# Patient Record
Sex: FEMALE | Race: WHITE | Employment: OTHER | URBAN - METROPOLITAN AREA
[De-identification: names, ages, dates, MRNs, and addresses within clinical notes are randomized per-mention and may not be internally consistent; named-entity substitution may affect disease eponyms.]

---

## 2017-01-25 ENCOUNTER — TELEPHONE (OUTPATIENT)
Dept: INTERNAL MEDICINE CLINIC | Age: 67
End: 2017-01-25

## 2017-01-26 RX ORDER — OMEPRAZOLE 20 MG/1
20 CAPSULE, DELAYED RELEASE ORAL DAILY
Qty: 90 CAPSULE | Refills: 3 | Status: SHIPPED | OUTPATIENT
Start: 2017-01-26 | End: 2017-12-11 | Stop reason: SDUPTHER

## 2017-01-26 RX ORDER — OMEPRAZOLE 20 MG/1
20 CAPSULE, DELAYED RELEASE ORAL DAILY
COMMUNITY
End: 2017-01-26 | Stop reason: SDUPTHER

## 2017-02-13 ENCOUNTER — TELEPHONE (OUTPATIENT)
Dept: INTERNAL MEDICINE CLINIC | Age: 67
End: 2017-02-13

## 2017-02-13 RX ORDER — BENZONATATE 200 MG/1
200 CAPSULE ORAL 3 TIMES DAILY PRN
COMMUNITY
End: 2017-02-13 | Stop reason: SDUPTHER

## 2017-02-13 RX ORDER — BENZONATATE 200 MG/1
200 CAPSULE ORAL 3 TIMES DAILY PRN
Qty: 21 CAPSULE | Refills: 0 | Status: SHIPPED | OUTPATIENT
Start: 2017-02-13 | End: 2018-12-11 | Stop reason: ALTCHOICE

## 2017-12-11 ENCOUNTER — OFFICE VISIT (OUTPATIENT)
Dept: INTERNAL MEDICINE CLINIC | Age: 67
End: 2017-12-11

## 2017-12-11 VITALS
HEART RATE: 60 BPM | BODY MASS INDEX: 45.64 KG/M2 | WEIGHT: 248 LBS | SYSTOLIC BLOOD PRESSURE: 136 MMHG | HEIGHT: 62 IN | DIASTOLIC BLOOD PRESSURE: 60 MMHG

## 2017-12-11 DIAGNOSIS — Z23 NEED FOR TDAP VACCINATION: ICD-10-CM

## 2017-12-11 DIAGNOSIS — K21.9 GASTROESOPHAGEAL REFLUX DISEASE, ESOPHAGITIS PRESENCE NOT SPECIFIED: Primary | ICD-10-CM

## 2017-12-11 DIAGNOSIS — F51.01 PRIMARY INSOMNIA: ICD-10-CM

## 2017-12-11 DIAGNOSIS — Z23 NEED FOR VACCINATION WITH 13-POLYVALENT PNEUMOCOCCAL CONJUGATE VACCINE: ICD-10-CM

## 2017-12-11 DIAGNOSIS — J45.20 MILD INTERMITTENT ASTHMA WITHOUT COMPLICATION: ICD-10-CM

## 2017-12-11 DIAGNOSIS — E66.01 MORBID OBESITY WITH BMI OF 40.0-44.9, ADULT (HCC): ICD-10-CM

## 2017-12-11 PROCEDURE — G0009 ADMIN PNEUMOCOCCAL VACCINE: HCPCS | Performed by: INTERNAL MEDICINE

## 2017-12-11 PROCEDURE — 99214 OFFICE O/P EST MOD 30 MIN: CPT | Performed by: INTERNAL MEDICINE

## 2017-12-11 PROCEDURE — 90670 PCV13 VACCINE IM: CPT | Performed by: INTERNAL MEDICINE

## 2017-12-11 PROCEDURE — 90715 TDAP VACCINE 7 YRS/> IM: CPT | Performed by: INTERNAL MEDICINE

## 2017-12-11 RX ORDER — AMITRIPTYLINE HYDROCHLORIDE 25 MG/1
25 TABLET, FILM COATED ORAL NIGHTLY
Qty: 90 TABLET | Refills: 3 | Status: SHIPPED | OUTPATIENT
Start: 2017-12-11 | End: 2018-12-11 | Stop reason: SDUPTHER

## 2017-12-11 RX ORDER — ZOLPIDEM TARTRATE 10 MG/1
10 TABLET ORAL NIGHTLY PRN
Qty: 90 TABLET | Refills: 1 | Status: SHIPPED | OUTPATIENT
Start: 2017-12-11 | End: 2018-12-11 | Stop reason: SDUPTHER

## 2017-12-11 RX ORDER — OMEPRAZOLE 20 MG/1
20 CAPSULE, DELAYED RELEASE ORAL DAILY
Qty: 90 CAPSULE | Refills: 3 | Status: SHIPPED | OUTPATIENT
Start: 2017-12-11 | End: 2017-12-11 | Stop reason: SDUPTHER

## 2017-12-11 RX ORDER — OMEPRAZOLE 20 MG/1
20 CAPSULE, DELAYED RELEASE ORAL DAILY
Qty: 90 CAPSULE | Refills: 3 | Status: SHIPPED | OUTPATIENT
Start: 2017-12-11 | End: 2018-10-29 | Stop reason: SDUPTHER

## 2017-12-11 RX ORDER — AMITRIPTYLINE HYDROCHLORIDE 25 MG/1
25 TABLET, FILM COATED ORAL NIGHTLY
Qty: 90 TABLET | Refills: 3 | Status: SHIPPED | OUTPATIENT
Start: 2017-12-11 | End: 2017-12-11 | Stop reason: SDUPTHER

## 2017-12-11 RX ORDER — NAPROXEN 500 MG/1
500 TABLET ORAL 2 TIMES DAILY WITH MEALS
Qty: 180 TABLET | Refills: 3 | Status: SHIPPED | OUTPATIENT
Start: 2017-12-11 | End: 2018-12-11 | Stop reason: SDUPTHER

## 2017-12-11 RX ORDER — NAPROXEN 500 MG/1
500 TABLET ORAL 2 TIMES DAILY WITH MEALS
Qty: 180 TABLET | Refills: 3 | Status: SHIPPED | OUTPATIENT
Start: 2017-12-11 | End: 2017-12-11 | Stop reason: SDUPTHER

## 2017-12-11 ASSESSMENT — PATIENT HEALTH QUESTIONNAIRE - PHQ9
SUM OF ALL RESPONSES TO PHQ QUESTIONS 1-9: 0
SUM OF ALL RESPONSES TO PHQ9 QUESTIONS 1 & 2: 0
2. FEELING DOWN, DEPRESSED OR HOPELESS: 0
1. LITTLE INTEREST OR PLEASURE IN DOING THINGS: 0

## 2017-12-12 ENCOUNTER — TELEPHONE (OUTPATIENT)
Dept: INTERNAL MEDICINE CLINIC | Age: 67
End: 2017-12-12

## 2017-12-12 NOTE — TELEPHONE ENCOUNTER
Pt states [facility] won't fill because [facility]     Now [facility] says [facility] has to cancel their orders   But after looking says 2 meds processed already and elavil too soon -- was filled in sept     Pt advised

## 2017-12-12 NOTE — TELEPHONE ENCOUNTER
Pt calling her scripts from yesterday were sent to [facility] and they need to go to [facility]. Pt states [facility] won't call [facility] and [facility] won't call [facility]. Please advise.

## 2017-12-29 ENCOUNTER — HOSPITAL ENCOUNTER (OUTPATIENT)
Dept: MAMMOGRAPHY | Age: 67
Discharge: HOME | End: 2017-12-29
Attending: INTERNAL MEDICINE | Admitting: INTERNAL MEDICINE

## 2017-12-29 ENCOUNTER — TELEPHONE (OUTPATIENT)
Dept: INTERNAL MEDICINE CLINIC | Age: 67
End: 2017-12-29

## 2017-12-29 DIAGNOSIS — Z12.31 ENCOUNTER FOR SCREENING MAMMOGRAM FOR BREAST CANCER: ICD-10-CM

## 2017-12-29 NOTE — TELEPHONE ENCOUNTER
Either dispense the medication as written for   the patient  Or if the patient so wishes she may schedule a followup to discuss a change in therapy

## 2018-01-22 RX ORDER — BUPROPION HYDROCHLORIDE 150 MG/1
TABLET ORAL
Qty: 90 TABLET | Refills: 3 | Status: SHIPPED | OUTPATIENT
Start: 2018-01-22 | End: 2018-12-11 | Stop reason: SDUPTHER

## 2018-03-16 ENCOUNTER — OFFICE VISIT (OUTPATIENT)
Dept: INTERNAL MEDICINE CLINIC | Age: 68
End: 2018-03-16

## 2018-03-16 VITALS
WEIGHT: 250 LBS | BODY MASS INDEX: 45.73 KG/M2 | DIASTOLIC BLOOD PRESSURE: 80 MMHG | TEMPERATURE: 97.4 F | HEART RATE: 76 BPM | SYSTOLIC BLOOD PRESSURE: 120 MMHG

## 2018-03-16 DIAGNOSIS — J45.21 MILD INTERMITTENT ASTHMA WITH ACUTE EXACERBATION: Primary | ICD-10-CM

## 2018-03-16 PROCEDURE — 99213 OFFICE O/P EST LOW 20 MIN: CPT | Performed by: INTERNAL MEDICINE

## 2018-03-16 RX ORDER — AZITHROMYCIN 250 MG/1
TABLET, FILM COATED ORAL
Qty: 1 PACKET | Refills: 0 | Status: SHIPPED | OUTPATIENT
Start: 2018-03-16 | End: 2018-03-26

## 2018-03-16 RX ORDER — ALBUTEROL SULFATE 90 UG/1
2 AEROSOL, METERED RESPIRATORY (INHALATION) EVERY 6 HOURS PRN
COMMUNITY
End: 2018-03-16 | Stop reason: SDUPTHER

## 2018-03-16 RX ORDER — NALTREXONE HYDROCHLORIDE AND BUPROPION HYDROCHLORIDE 8; 90 MG/1; MG/1
TABLET, EXTENDED RELEASE ORAL
COMMUNITY
Start: 2018-03-08 | End: 2018-12-11 | Stop reason: ALTCHOICE

## 2018-03-16 RX ORDER — ALBUTEROL SULFATE 90 UG/1
2 AEROSOL, METERED RESPIRATORY (INHALATION) EVERY 6 HOURS PRN
Qty: 1 INHALER | Refills: 5 | Status: SHIPPED
Start: 2018-03-16 | End: 2020-08-28 | Stop reason: SDUPTHER

## 2018-03-16 NOTE — PROGRESS NOTES
The patient is here for an acute appointment    Her  recently had a respiratory illness.   She feels like she is caught the illness from her     She complains of a cough productive of yellow sputum, wheezing and shortness of breath    Physical examination  Scattered wheezes  No palpable adenopathy  No exudate  Abdomen soft      Impression  Acute exacerbation of chronic obstructive pulmonary disease from lower respiratory tract infection    Plan  Albuterol 4 times a day when necessary and start on azithromycin

## 2018-09-11 ENCOUNTER — TELEPHONE (OUTPATIENT)
Dept: INTERNAL MEDICINE CLINIC | Age: 68
End: 2018-09-11

## 2018-09-20 ENCOUNTER — TELEPHONE (OUTPATIENT)
Dept: DERMATOLOGY | Age: 68
End: 2018-09-20

## 2018-10-29 RX ORDER — OMEPRAZOLE 20 MG/1
CAPSULE, DELAYED RELEASE ORAL
Qty: 90 CAPSULE | Refills: 3 | Status: SHIPPED | OUTPATIENT
Start: 2018-10-29 | End: 2019-10-26 | Stop reason: SDUPTHER

## 2018-12-11 ENCOUNTER — OFFICE VISIT (OUTPATIENT)
Dept: INTERNAL MEDICINE CLINIC | Age: 68
End: 2018-12-11
Payer: MEDICARE

## 2018-12-11 ENCOUNTER — TELEPHONE (OUTPATIENT)
Dept: INTERNAL MEDICINE CLINIC | Age: 68
End: 2018-12-11

## 2018-12-11 VITALS
HEART RATE: 88 BPM | DIASTOLIC BLOOD PRESSURE: 82 MMHG | WEIGHT: 232.8 LBS | SYSTOLIC BLOOD PRESSURE: 126 MMHG | BODY MASS INDEX: 42.84 KG/M2 | HEIGHT: 62 IN

## 2018-12-11 DIAGNOSIS — E66.01 MORBID OBESITY WITH BMI OF 40.0-44.9, ADULT (HCC): ICD-10-CM

## 2018-12-11 DIAGNOSIS — E78.49 OTHER HYPERLIPIDEMIA: ICD-10-CM

## 2018-12-11 DIAGNOSIS — Z00.00 ROUTINE GENERAL MEDICAL EXAMINATION AT A HEALTH CARE FACILITY: Primary | ICD-10-CM

## 2018-12-11 DIAGNOSIS — F51.01 PRIMARY INSOMNIA: ICD-10-CM

## 2018-12-11 PROCEDURE — 99397 PER PM REEVAL EST PAT 65+ YR: CPT | Performed by: INTERNAL MEDICINE

## 2018-12-11 RX ORDER — ZOLPIDEM TARTRATE 10 MG/1
10 TABLET ORAL NIGHTLY PRN
Qty: 90 TABLET | Refills: 1 | Status: SHIPPED | OUTPATIENT
Start: 2018-12-11 | End: 2019-06-14 | Stop reason: SDUPTHER

## 2018-12-11 RX ORDER — AMITRIPTYLINE HYDROCHLORIDE 25 MG/1
25 TABLET, FILM COATED ORAL NIGHTLY
Qty: 90 TABLET | Refills: 3 | Status: SHIPPED | OUTPATIENT
Start: 2018-12-11 | End: 2019-12-09 | Stop reason: SDUPTHER

## 2018-12-11 RX ORDER — BUPROPION HYDROCHLORIDE 150 MG/1
TABLET ORAL
Qty: 90 TABLET | Refills: 3 | Status: SHIPPED | OUTPATIENT
Start: 2018-12-11 | End: 2019-12-16 | Stop reason: SDUPTHER

## 2018-12-11 RX ORDER — NAPROXEN 500 MG/1
500 TABLET ORAL 2 TIMES DAILY WITH MEALS
Qty: 180 TABLET | Refills: 3 | Status: SHIPPED | OUTPATIENT
Start: 2018-12-11 | End: 2019-12-16 | Stop reason: SDUPTHER

## 2018-12-11 ASSESSMENT — PATIENT HEALTH QUESTIONNAIRE - PHQ9
SUM OF ALL RESPONSES TO PHQ QUESTIONS 1-9: 0
SUM OF ALL RESPONSES TO PHQ9 QUESTIONS 1 & 2: 0
2. FEELING DOWN, DEPRESSED OR HOPELESS: 0
SUM OF ALL RESPONSES TO PHQ QUESTIONS 1-9: 0
1. LITTLE INTEREST OR PLEASURE IN DOING THINGS: 0

## 2018-12-13 NOTE — PROGRESS NOTES
Chief Complaint   Patient presents with    Medication Refill       Reanna Bright 76 y.o. female is here for Physical examination in renewal of her medications for her chronic problems. .  Denies chest pain chest tightness shortness of breath or other acute cardiovascular symptoms     Her asthma has been reasonably well-controlled    She is status post gastric sleeve for morbid obesity. She is being followed at the [place]. She is attempting to the best of her ability to maintain what has been substantial weight loss    She continues on a multidrug regimen as listed    She denies any other associated symptoms or modifying factors    Current Outpatient Prescriptions on File Prior to Visit   Medication Sig Dispense Refill    omeprazole (PRILOSEC) 20 MG delayed release capsule TAKE 1 CAPSULE DAILY 90 capsule 3    ESTRACE VAGINAL 0.1 MG/GM vaginal cream       ibuprofen (ADVIL;MOTRIN) 800 MG tablet Take 1 tablet by mouth 3 times daily as needed for Pain 270 tablet 1    vitamin B-12 (CYANOCOBALAMIN) 100 MCG tablet Take 50 mcg by mouth daily.  Vitamin D (CHOLECALCIFEROL) 1000 UNITS CAPS capsule Take 1,250 Units by mouth daily.  Biotin 5000 MCG CAPS Take  by mouth.  calcium carbonate 600 MG TABS tablet Take 1 tablet by mouth daily.  estradiol (ESTRACE) 1 MG tablet Take 1 mg by mouth daily.  progesterone (PROMETRIUM) 200 MG capsule Take 200 mg by mouth. Days 16-25       albuterol sulfate HFA (PROAIR HFA) 108 (90 Base) MCG/ACT inhaler Inhale 2 puffs into the lungs every 6 hours as needed for Wheezing 1 Inhaler 5    Omega-3 Fatty Acids (FISH OIL) 1000 MG CAPS Take 3,000 mg by mouth 3 times daily.  EPIPEN 2-KAPIL 0.3 MG/0.3ML MEEK injection       albuterol (PROVENTIL) (2.5 MG/3ML) 0.083% nebulizer solution Take 2.5 mg by nebulization every 6 hours as needed for Wheezing. No current facility-administered medications on file prior to visit.         Past Medical History:

## 2019-01-11 ENCOUNTER — TELEPHONE (OUTPATIENT)
Dept: INTERNAL MEDICINE CLINIC | Age: 69
End: 2019-01-11

## 2019-01-11 DIAGNOSIS — E78.5 HYPERLIPIDEMIA, UNSPECIFIED HYPERLIPIDEMIA TYPE: Primary | ICD-10-CM

## 2019-01-11 DIAGNOSIS — E78.5 HYPERLIPIDEMIA, UNSPECIFIED HYPERLIPIDEMIA TYPE: ICD-10-CM

## 2019-01-11 LAB
CHOLESTEROL, TOTAL: 230 MG/DL (ref 0–199)
HDLC SERPL-MCNC: 72 MG/DL (ref 40–60)
LDL CHOLESTEROL CALCULATED: 127 MG/DL
TRIGL SERPL-MCNC: 154 MG/DL (ref 0–150)
VLDLC SERPL CALC-MCNC: 31 MG/DL

## 2019-01-18 ENCOUNTER — HOSPITAL ENCOUNTER (OUTPATIENT)
Dept: MAMMOGRAPHY | Age: 69
Setting detail: THERAPIES SERIES
Discharge: HOME | End: 2019-01-18
Payer: MEDICARE

## 2019-01-18 DIAGNOSIS — Z12.31 BREAST CANCER SCREENING BY MAMMOGRAM: ICD-10-CM

## 2019-01-18 PROCEDURE — 77067 SCR MAMMO BI INCL CAD: CPT

## 2019-01-29 ENCOUNTER — OFFICE VISIT (OUTPATIENT)
Dept: DERMATOLOGY | Age: 69
End: 2019-01-29
Payer: MEDICARE

## 2019-01-29 DIAGNOSIS — D18.00 ANGIOMA: ICD-10-CM

## 2019-01-29 DIAGNOSIS — L73.8 SEBACEOUS GLAND HYPERPLASIA: ICD-10-CM

## 2019-01-29 DIAGNOSIS — L85.1 STUCCO KERATOSES: ICD-10-CM

## 2019-01-29 DIAGNOSIS — L82.1 SEBORRHEIC KERATOSIS: ICD-10-CM

## 2019-01-29 DIAGNOSIS — D22.9 MULTIPLE NEVI: Primary | ICD-10-CM

## 2019-01-29 DIAGNOSIS — L81.4 LENTIGINES: ICD-10-CM

## 2019-01-29 PROCEDURE — 99203 OFFICE O/P NEW LOW 30 MIN: CPT | Performed by: DERMATOLOGY

## 2019-06-13 DIAGNOSIS — F51.01 PRIMARY INSOMNIA: ICD-10-CM

## 2019-06-13 RX ORDER — ZOLPIDEM TARTRATE 10 MG/1
10 TABLET ORAL NIGHTLY PRN
Qty: 90 TABLET | Refills: 1 | Status: CANCELLED | OUTPATIENT
Start: 2019-06-13 | End: 2019-12-10

## 2019-06-14 RX ORDER — ZOLPIDEM TARTRATE 10 MG/1
10 TABLET ORAL NIGHTLY PRN
Qty: 90 TABLET | Refills: 1 | Status: SHIPPED | OUTPATIENT
Start: 2019-06-14 | End: 2019-12-16 | Stop reason: SDUPTHER

## 2019-07-29 ENCOUNTER — OFFICE VISIT (OUTPATIENT)
Dept: DERMATOLOGY | Age: 69
End: 2019-07-29
Payer: MEDICARE

## 2019-07-29 DIAGNOSIS — L82.1 SEBORRHEIC KERATOSIS: ICD-10-CM

## 2019-07-29 DIAGNOSIS — L81.4 LENTIGINES: ICD-10-CM

## 2019-07-29 DIAGNOSIS — B00.9 HSV INFECTION: ICD-10-CM

## 2019-07-29 DIAGNOSIS — L30.9 DERMATITIS: Primary | ICD-10-CM

## 2019-07-29 DIAGNOSIS — Z85.828 HISTORY OF NONMELANOMA SKIN CANCER: ICD-10-CM

## 2019-07-29 DIAGNOSIS — D22.9 MULTIPLE NEVI: ICD-10-CM

## 2019-07-29 DIAGNOSIS — L85.1 STUCCO KERATOSES: ICD-10-CM

## 2019-07-29 PROCEDURE — 99214 OFFICE O/P EST MOD 30 MIN: CPT | Performed by: DERMATOLOGY

## 2019-07-29 RX ORDER — CLOBETASOL PROPIONATE 0.5 MG/G
OINTMENT TOPICAL
Qty: 30 G | Refills: 0 | Status: SHIPPED | OUTPATIENT
Start: 2019-07-29 | End: 2020-12-15 | Stop reason: ALTCHOICE

## 2019-07-29 RX ORDER — VALACYCLOVIR HYDROCHLORIDE 1 G/1
TABLET, FILM COATED ORAL
Qty: 16 TABLET | Refills: 2 | Status: SHIPPED | OUTPATIENT
Start: 2019-07-29

## 2019-07-29 NOTE — PATIENT INSTRUCTIONS
Protecting Yourself From the Sun    · Apply broad spectrum water resistant sunscreen with an SPF of at least 30 to exposed areas of the skin. Dont forget the ears and lips! Remember to reapply sunscreen about every 2 hours and after swimming or sweating. · Wear sun protective clothing. Swim shirts (aka. rash guards) are a great idea and negates the need to reapply sunscreen in those areas.      · Seek the shade whenever possible especially between the hours of 10 am and 4 pm when the suns rays are the strongest.     · Avoid tanning beds        botox - 12/unit  - 16-20 units per site  - lasts 3-4 mos    Filler - 500-600/syringe  - lasts 9-12 mos

## 2019-07-29 NOTE — PROGRESS NOTES
scaly pink patch  2. trunk and extremities with scattered brown and red macules and papules   Chest, arm and L temple with dull brown macules  3. Depressed round scar on the R nasal tip - clear  4. Dorsum of the hands with tan macules; cheeks with tan macules  5. Legs with grayish-tan stuck-on dull 2-3 mm papules  6. Lips clear    Assessment and Plan     1. Dermatitis - R middle finger  - clobetasol oint bid; ed se/misuse    2. Benign-appearing nevi, SK's (chest, arm, L temple) and angiomas  3. Hx of NMSC, no signs recurrence  - educ re ABCD's of MM   educ sun protection   encouraged skin check yearly (sooner if indicated), self checks    4. Lentigines on the hands and cheeks  - ed laser, 150/trx; ed mult trx, dyspig, erythema, downtime, new lesions  - ed peels for the face - VI - 250    5. Stucco keratosis  - ed/reassured    6. HSV flares - lips - clear today - valtrex prn flares    Ed botox for the glabella - brow lift and possibly lower FH line  Ed filler for the NL and MM area.     >30 mins spent in discussion with pt regarding above

## 2019-10-28 RX ORDER — OMEPRAZOLE 20 MG/1
CAPSULE, DELAYED RELEASE ORAL
Qty: 90 CAPSULE | Refills: 4 | Status: SHIPPED | OUTPATIENT
Start: 2019-10-28 | End: 2019-12-16 | Stop reason: SDUPTHER

## 2019-11-24 RX ORDER — PROMETHAZINE HYDROCHLORIDE 25 MG/1
25 TABLET ORAL 4 TIMES DAILY PRN
Qty: 20 TABLET | Refills: 0 | Status: SHIPPED | OUTPATIENT
Start: 2019-11-24 | End: 2019-12-01

## 2019-11-25 ENCOUNTER — TELEPHONE (OUTPATIENT)
Dept: FAMILY MEDICINE CLINIC | Age: 69
End: 2019-11-25

## 2019-12-09 RX ORDER — AMITRIPTYLINE HYDROCHLORIDE 25 MG/1
TABLET, FILM COATED ORAL
Qty: 90 TABLET | Refills: 4 | Status: SHIPPED | OUTPATIENT
Start: 2019-12-09 | End: 2020-12-15 | Stop reason: SDUPTHER

## 2019-12-16 ENCOUNTER — OFFICE VISIT (OUTPATIENT)
Dept: INTERNAL MEDICINE CLINIC | Age: 69
End: 2019-12-16
Payer: MEDICARE

## 2019-12-16 VITALS
BODY MASS INDEX: 42.88 KG/M2 | DIASTOLIC BLOOD PRESSURE: 80 MMHG | WEIGHT: 233 LBS | SYSTOLIC BLOOD PRESSURE: 138 MMHG | HEIGHT: 62 IN | HEART RATE: 76 BPM

## 2019-12-16 DIAGNOSIS — F51.01 PRIMARY INSOMNIA: ICD-10-CM

## 2019-12-16 DIAGNOSIS — R73.9 HYPERGLYCEMIA: ICD-10-CM

## 2019-12-16 DIAGNOSIS — E55.9 VITAMIN D DEFICIENCY: ICD-10-CM

## 2019-12-16 DIAGNOSIS — D64.9 ANEMIA, UNSPECIFIED TYPE: ICD-10-CM

## 2019-12-16 DIAGNOSIS — E78.5 HYPERLIPIDEMIA, UNSPECIFIED HYPERLIPIDEMIA TYPE: Primary | ICD-10-CM

## 2019-12-16 PROCEDURE — 99214 OFFICE O/P EST MOD 30 MIN: CPT | Performed by: INTERNAL MEDICINE

## 2019-12-16 RX ORDER — BUPROPION HYDROCHLORIDE 150 MG/1
TABLET ORAL
Qty: 90 TABLET | Refills: 3 | Status: SHIPPED | OUTPATIENT
Start: 2019-12-16 | End: 2020-12-15 | Stop reason: SDUPTHER

## 2019-12-16 RX ORDER — NAPROXEN 500 MG/1
500 TABLET ORAL 2 TIMES DAILY WITH MEALS
Qty: 180 TABLET | Refills: 3 | Status: SHIPPED | OUTPATIENT
Start: 2019-12-16 | End: 2021-01-14 | Stop reason: SDUPTHER

## 2019-12-16 RX ORDER — OMEPRAZOLE 20 MG/1
20 CAPSULE, DELAYED RELEASE ORAL DAILY
Qty: 90 CAPSULE | Refills: 3 | Status: SHIPPED | OUTPATIENT
Start: 2019-12-16 | End: 2020-08-28 | Stop reason: SDUPTHER

## 2019-12-16 RX ORDER — LORCASERIN HYDROCHLORIDE HEMIHYDRATE 10 MG/1
TABLET ORAL
COMMUNITY
Start: 2019-10-15 | End: 2020-02-27 | Stop reason: ALTCHOICE

## 2019-12-16 RX ORDER — ZOLPIDEM TARTRATE 10 MG/1
10 TABLET ORAL NIGHTLY PRN
Qty: 90 TABLET | Refills: 1 | Status: SHIPPED | OUTPATIENT
Start: 2019-12-16 | End: 2020-12-15 | Stop reason: SDUPTHER

## 2019-12-16 ASSESSMENT — PATIENT HEALTH QUESTIONNAIRE - PHQ9
2. FEELING DOWN, DEPRESSED OR HOPELESS: 0
SUM OF ALL RESPONSES TO PHQ QUESTIONS 1-9: 0
SUM OF ALL RESPONSES TO PHQ QUESTIONS 1-9: 0
SUM OF ALL RESPONSES TO PHQ9 QUESTIONS 1 & 2: 0
1. LITTLE INTEREST OR PLEASURE IN DOING THINGS: 0

## 2019-12-17 LAB
A/G RATIO: 1.7 (ref 1.1–2.2)
ALBUMIN SERPL-MCNC: 4 G/DL (ref 3.4–5)
ALP BLD-CCNC: 91 U/L (ref 40–129)
ALT SERPL-CCNC: 12 U/L (ref 10–40)
ANION GAP SERPL CALCULATED.3IONS-SCNC: 13 MMOL/L (ref 3–16)
AST SERPL-CCNC: 17 U/L (ref 15–37)
BILIRUB SERPL-MCNC: 0.4 MG/DL (ref 0–1)
BUN BLDV-MCNC: 16 MG/DL (ref 7–20)
CALCIUM SERPL-MCNC: 9.3 MG/DL (ref 8.3–10.6)
CHLORIDE BLD-SCNC: 100 MMOL/L (ref 99–110)
CHOLESTEROL, TOTAL: 186 MG/DL (ref 0–199)
CO2: 25 MMOL/L (ref 21–32)
CREAT SERPL-MCNC: <0.5 MG/DL (ref 0.6–1.2)
GFR AFRICAN AMERICAN: >60
GFR NON-AFRICAN AMERICAN: >60
GLOBULIN: 2.3 G/DL
GLUCOSE BLD-MCNC: 82 MG/DL (ref 70–99)
HDLC SERPL-MCNC: 70 MG/DL (ref 40–60)
LDL CHOLESTEROL CALCULATED: 98 MG/DL
POTASSIUM SERPL-SCNC: 4.6 MMOL/L (ref 3.5–5.1)
SODIUM BLD-SCNC: 138 MMOL/L (ref 136–145)
TOTAL PROTEIN: 6.3 G/DL (ref 6.4–8.2)
TRIGL SERPL-MCNC: 88 MG/DL (ref 0–150)
VITAMIN B-12: >2000 PG/ML (ref 211–911)
VITAMIN D 25-HYDROXY: 51 NG/ML
VLDLC SERPL CALC-MCNC: 18 MG/DL

## 2019-12-18 LAB
ESTIMATED AVERAGE GLUCOSE: 93.9 MG/DL
HBA1C MFR BLD: 4.9 %

## 2020-01-27 ENCOUNTER — TELEPHONE (OUTPATIENT)
Dept: INTERNAL MEDICINE CLINIC | Age: 70
End: 2020-01-27

## 2020-02-05 ENCOUNTER — HOSPITAL ENCOUNTER (OUTPATIENT)
Dept: MAMMOGRAPHY | Age: 70
Discharge: HOME | End: 2020-02-05
Payer: MEDICARE

## 2020-02-05 PROCEDURE — 77067 SCR MAMMO BI INCL CAD: CPT

## 2020-02-12 ENCOUNTER — HOSPITAL ENCOUNTER (OUTPATIENT)
Dept: WOMENS IMAGING | Age: 70
Discharge: HOME | End: 2020-02-12
Payer: MEDICARE

## 2020-02-12 PROCEDURE — G0279 TOMOSYNTHESIS, MAMMO: HCPCS

## 2020-02-17 ENCOUNTER — PRE-PROCEDURE TELEPHONE (OUTPATIENT)
Dept: WOMENS IMAGING | Age: 70
End: 2020-02-17

## 2020-02-17 ENCOUNTER — HOSPITAL ENCOUNTER (OUTPATIENT)
Dept: ULTRASOUND IMAGING | Age: 70
Discharge: HOME | End: 2020-02-17
Payer: MEDICARE

## 2020-02-17 PROCEDURE — 76642 ULTRASOUND BREAST LIMITED: CPT

## 2020-02-21 ENCOUNTER — HOSPITAL ENCOUNTER (OUTPATIENT)
Dept: WOMENS IMAGING | Age: 70
Discharge: HOME | End: 2020-02-21
Payer: MEDICARE

## 2020-02-21 ENCOUNTER — HOSPITAL ENCOUNTER (OUTPATIENT)
Dept: ULTRASOUND IMAGING | Age: 70
Discharge: HOME | End: 2020-02-21
Payer: MEDICARE

## 2020-02-21 PROCEDURE — 88360 TUMOR IMMUNOHISTOCHEM/MANUAL: CPT

## 2020-02-21 PROCEDURE — A4648 IMPLANTABLE TISSUE MARKER: HCPCS

## 2020-02-21 PROCEDURE — 2500000003 HC RX 250 WO HCPCS: Performed by: OBSTETRICS & GYNECOLOGY

## 2020-02-21 PROCEDURE — 77065 DX MAMMO INCL CAD UNI: CPT

## 2020-02-21 PROCEDURE — 88342 IMHCHEM/IMCYTCHM 1ST ANTB: CPT

## 2020-02-21 PROCEDURE — 88305 TISSUE EXAM BY PATHOLOGIST: CPT

## 2020-02-21 RX ORDER — LIDOCAINE HYDROCHLORIDE 10 MG/ML
5 INJECTION, SOLUTION EPIDURAL; INFILTRATION; INTRACAUDAL; PERINEURAL ONCE
Status: COMPLETED | OUTPATIENT
Start: 2020-02-21 | End: 2020-02-21

## 2020-02-21 RX ORDER — LIDOCAINE HYDROCHLORIDE AND EPINEPHRINE 10; 10 MG/ML; UG/ML
20 INJECTION, SOLUTION INFILTRATION; PERINEURAL ONCE
Status: COMPLETED | OUTPATIENT
Start: 2020-02-21 | End: 2020-02-21

## 2020-02-21 RX ADMIN — LIDOCAINE HYDROCHLORIDE,EPINEPHRINE BITARTRATE 20 ML: 10; .01 INJECTION, SOLUTION INFILTRATION; PERINEURAL at 15:08

## 2020-02-21 RX ADMIN — LIDOCAINE HYDROCHLORIDE 5 ML: 10 INJECTION, SOLUTION EPIDURAL; INFILTRATION; INTRACAUDAL; PERINEURAL at 15:05

## 2020-02-21 ASSESSMENT — PAIN SCALES - GENERAL: PAINLEVEL_OUTOF10: 3

## 2020-02-24 ENCOUNTER — FOLLOWUP TELEPHONE ENCOUNTER (OUTPATIENT)
Dept: WOMENS IMAGING | Age: 70
End: 2020-02-24

## 2020-02-24 NOTE — PROGRESS NOTES
Nurse Navigator reviewed breast biopsy results showing IDC on the pathology report. NN explained the terminology and reviewed the results for ER/RI and the additional HER2 test. We discussed several questions and process for establishing a care team and possible additional testing. [facility] and patient prefers to see Dr Eun Berry at [facility]. NN offered additional website reading if interested. Given ACS, NCCN, and [email or web address].  Pt/family given NN phone number for further assistance. Elena Man is  (52 years in May), one daughter [facility] who recently moved back here with 21 month old from [place]. Very supportive family. Worked as an  and still does some work, own business. yes

## 2020-02-26 ASSESSMENT — ENCOUNTER SYMPTOMS
RESPIRATORY NEGATIVE: 1
EYES NEGATIVE: 1
GASTROINTESTINAL NEGATIVE: 1

## 2020-02-26 NOTE — PROGRESS NOTES
ASCO/CAP guidelines: 0, 1+, 2+, 3+. Her2/renay  \"positive\" (3+) requires circrumferential membrane staining that is  complete, intense and in >10% of tumor cells. Equivocal (2+) cases are  defined as weak to moderate complete membrane staining in >10% of cells. Negative cases (0 or 1+, respectively) display no staining or incomplete  membrane staining that is faint/barely perceptible and in >10% of tumor  cells.   Gail Wu et al, Human Epidermal Growth Factor Receptor 2  Testing in Breast Cancer, Arch Pathol Lab Med, Vol 142, November, 2018). Select cases, including all 2+/equivocal for Her2/renay on routine IHC  staining, will be sent for Her2/renay analysis by FISH. The stain was performed on [employer], and performed with  the [employer] ultraView Universal DAB detection kit. These assays have not  been validated on decalcified tissue. Results should be interpreted with  caution given the likelihood of false negativity on decalcified  specimens. Analyte specific reagent (ASR) disclaimer: The use of one or  more reagents in the above tests is regulated as an analyte specific  reagent. These tests were developed and their performance characteristics  determined by the [facility]. They have  not been cleared by the [facility] and Drug Administration.  The FDA has  determined that such clearance or approval is not necessary.    Adam Knutson            Past Medical History:   Diagnosis Date    Basal cell carcinoma     Depression     Obesity     Post-menopausal    :        Past Surgical History:   Procedure Laterality Date    KNEE ARTHROSCOPY  5/2004    left knee    KNEE SURGERY  5/2005    lt TKR    MOHS SURGERY     :        Allergies as of 02/27/2020 - Review Complete 02/17/2020   Allergen Reaction Noted    Cephalosporins  12/14/2011    Cymbalta [duloxetine hcl]  02/11/2014    Keflex [cephalexin] Nausea Only 04/08/2014    Lyrica [pregabalin]  02/11/2014    Vioxx  2011    Codeine Nausea And Vomiting 2014   :        Social History     Tobacco Use    Smoking status: Never Smoker    Smokeless tobacco: Never Used   Substance Use Topics    Alcohol use: Not on file    Drug use: Not on file   :      Family History: Mother: Breast cancer with metastatic disease, diagnosed age 68  Sister: Esophageal cancer, diagnosed age 62  No additional family history of breast or ovarian cancer    Hormonal History:   a.0  m.0  Menarche at age 15. First live birth at age 27. did breastfeed. Postmenopausal at age 46  Hysterectomy: no hysterectomy  Current HRT user  OCP not taking    Medications:  Medication documentation has been reviewedin the electronic medical record and patient office intake form. Exam:  There were no vitals taken for this visit. Constitutional: She appears well-nourished. No apparent distress. Breast: The patient was examined in the upright and supine position. She has a \"DDD\" cup breast. Breasts are symmetrically ptotic. Right: There were no new masses or changes in breast contour. There were no skin changes of the breast or nipple areolar complex. There was no nipple inversion or discharge. Left: There were no new masses or changes in breast contour. There were no skin changes of the breast or nipple areolar complex. There was no nipple inversion or discharge. There is no axillary lymphadenopathy palpated bilaterally. Head: Normocephalic and atraumatic. Eyes: EOM are normal. Pupilsare equal, round, and reactive to light. Neck: Neck supple. No tracheal deviation present. Cardiovascular: regular rate. Extremities appear well perfused. Pulmonary: respirations are non-labored and without audible distress  Lymphatics: no palpable axillary or cervical lymphadenopathy. Skin: No rash noted. No erythema. Neurologic: alert and oriented.           Assessment/Plan:  [id number]:  IA left breast cancer  ER + NM+ HER2 negative. I have reviewed the results of her most recent breast imaging and pathology with her. The surgical rational and technical details of undergoing breast conservation therapy versus a mastectomy were reviewed. She understood that patients who undergo breast conservation therapy, systemic therapy, and radiotherapy have comparable local recurrences and overall survival to those patients undergoing a mastectomy. She understands that patients may experience an asymmetric change in breast contour with breast conservation that can be exacerbated with radiation therapy. We discussed the technique of needle localization. I explained that on the day of surgery she will be taken to radiology where a needle and small wire will be advanced to the location of the lesion. This will be done by targeting the lesion, near which the clip was placed post biopsy. This is performed using either mammographic or ultrasound guidance. We discussed the aspects of breast conservation including the need for negative margins. We discussed the risk of up to a 15-20% chance of having a positive margin and that this would in fact lead to additional surgery. We discussed marking the surgical cavity for potential future radiation. We also discussed the option of a mastectomy. We discussed that although we remove the breast in this procedure, there is still a risk of recurrent disease and reviewed expectations on residual breast tissue. We discussed margins. We reviewed hospitalization and the need for surgical drains. We discussed additional risk and benefits of surgery which include but are not limited to anesthesia related risks, bleeding, range of motion difficulty, infection (<4%), wound complications and unappealing cosmetics. We discussed the risk of contralateral breast cancer. We also discussed the possibility of future recurrences. We reviewed expectations for recovery.     We discussed a sentinel lymph node biopsy in

## 2020-02-27 ENCOUNTER — INITIAL CONSULT (OUTPATIENT)
Dept: SURGERY | Age: 70
End: 2020-02-27
Payer: MEDICARE

## 2020-02-27 VITALS
SYSTOLIC BLOOD PRESSURE: 119 MMHG | BODY MASS INDEX: 43.61 KG/M2 | HEIGHT: 62 IN | HEART RATE: 91 BPM | WEIGHT: 237 LBS | DIASTOLIC BLOOD PRESSURE: 83 MMHG | OXYGEN SATURATION: 96 %

## 2020-02-27 PROCEDURE — 99205 OFFICE O/P NEW HI 60 MIN: CPT | Performed by: SURGERY

## 2020-02-27 RX ORDER — SODIUM CHLORIDE 0.9 % (FLUSH) 0.9 %
10 SYRINGE (ML) INJECTION EVERY 12 HOURS SCHEDULED
Status: CANCELLED | OUTPATIENT
Start: 2020-02-27

## 2020-02-27 RX ORDER — SODIUM CHLORIDE 0.9 % (FLUSH) 0.9 %
10 SYRINGE (ML) INJECTION PRN
Status: CANCELLED | OUTPATIENT
Start: 2020-02-27

## 2020-02-27 RX ORDER — LIDOCAINE HYDROCHLORIDE 10 MG/ML
0.1 INJECTION, SOLUTION EPIDURAL; INFILTRATION; INTRACAUDAL; PERINEURAL
Status: SHIPPED | OUTPATIENT
Start: 2020-02-27 | End: 2020-02-27

## 2020-02-27 RX ORDER — SODIUM CHLORIDE, SODIUM LACTATE, POTASSIUM CHLORIDE, CALCIUM CHLORIDE 600; 310; 30; 20 MG/100ML; MG/100ML; MG/100ML; MG/100ML
INJECTION, SOLUTION INTRAVENOUS CONTINUOUS
Status: CANCELLED | OUTPATIENT
Start: 2020-02-27

## 2020-02-28 ENCOUNTER — TELEPHONE (OUTPATIENT)
Dept: SURGERY | Age: 70
End: 2020-02-28

## 2020-02-28 NOTE — TELEPHONE ENCOUNTER
Received a call from Tremaine santos,  for [facility]. She stated, \" I just spoke with Mrs. Dale Guillermo to let her know we had received a referral for her to see Dr. Guy Ortez, the patient stated, \" Oh no!, I'm not seeing him, I'm not going to someone I don't know. \"   [place] asked her if Dr. Eleuterio Tyler knew this and Tremaine santos stated patient then said,\" They just put you with anyone, you know he's [facility] \"  \"I'm going to see who my friends go to. \"  Tremaine santos stated, \" I tried to ask her some questions and she hung up the phone. \"    Dr. Eleuterio Tyler has been made aware.

## 2020-03-09 ENCOUNTER — TELEPHONE (OUTPATIENT)
Dept: SURGERY | Age: 70
End: 2020-03-09

## 2020-03-10 NOTE — TELEPHONE ENCOUNTER
I have contacted surgery scheduling, mammogram NN Mary Jamison RN, [facility] department , and Nuclear medicine to let them know this patient has cancelled her surgery.

## 2020-03-19 ENCOUNTER — TELEPHONE (OUTPATIENT)
Dept: INTERNAL MEDICINE CLINIC | Age: 70
End: 2020-03-19

## 2020-03-24 ENCOUNTER — OFFICE VISIT (OUTPATIENT)
Dept: INTERNAL MEDICINE CLINIC | Age: 70
End: 2020-03-24
Payer: MEDICARE

## 2020-03-24 VITALS
DIASTOLIC BLOOD PRESSURE: 66 MMHG | TEMPERATURE: 98.2 F | HEIGHT: 62 IN | WEIGHT: 237 LBS | SYSTOLIC BLOOD PRESSURE: 120 MMHG | BODY MASS INDEX: 43.61 KG/M2 | HEART RATE: 80 BPM

## 2020-03-24 LAB
A/G RATIO: 1.9 (ref 1.1–2.2)
ALBUMIN SERPL-MCNC: 4.4 G/DL (ref 3.4–5)
ALP BLD-CCNC: 110 U/L (ref 40–129)
ALT SERPL-CCNC: 18 U/L (ref 10–40)
ANION GAP SERPL CALCULATED.3IONS-SCNC: 13 MMOL/L (ref 3–16)
AST SERPL-CCNC: 22 U/L (ref 15–37)
BASOPHILS ABSOLUTE: 0.1 K/UL (ref 0–0.2)
BASOPHILS RELATIVE PERCENT: 0.7 %
BILIRUB SERPL-MCNC: 0.4 MG/DL (ref 0–1)
BUN BLDV-MCNC: 17 MG/DL (ref 7–20)
CALCIUM SERPL-MCNC: 10.2 MG/DL (ref 8.3–10.6)
CHLORIDE BLD-SCNC: 101 MMOL/L (ref 99–110)
CO2: 29 MMOL/L (ref 21–32)
CREAT SERPL-MCNC: 0.5 MG/DL (ref 0.6–1.2)
EOSINOPHILS ABSOLUTE: 0.2 K/UL (ref 0–0.6)
EOSINOPHILS RELATIVE PERCENT: 3 %
GFR AFRICAN AMERICAN: >60
GFR NON-AFRICAN AMERICAN: >60
GLOBULIN: 2.3 G/DL
GLUCOSE BLD-MCNC: 84 MG/DL (ref 70–99)
HCT VFR BLD CALC: 43.4 % (ref 36–48)
HEMOGLOBIN: 14.1 G/DL (ref 12–16)
LYMPHOCYTES ABSOLUTE: 2.8 K/UL (ref 1–5.1)
LYMPHOCYTES RELATIVE PERCENT: 35.2 %
MCH RBC QN AUTO: 32.3 PG (ref 26–34)
MCHC RBC AUTO-ENTMCNC: 32.6 G/DL (ref 31–36)
MCV RBC AUTO: 99.2 FL (ref 80–100)
MONOCYTES ABSOLUTE: 0.6 K/UL (ref 0–1.3)
MONOCYTES RELATIVE PERCENT: 7.6 %
NEUTROPHILS ABSOLUTE: 4.2 K/UL (ref 1.7–7.7)
NEUTROPHILS RELATIVE PERCENT: 53.5 %
PDW BLD-RTO: 13.7 % (ref 12.4–15.4)
PLATELET # BLD: 270 K/UL (ref 135–450)
PMV BLD AUTO: 8.2 FL (ref 5–10.5)
POTASSIUM SERPL-SCNC: 4.8 MMOL/L (ref 3.5–5.1)
RBC # BLD: 4.37 M/UL (ref 4–5.2)
SODIUM BLD-SCNC: 143 MMOL/L (ref 136–145)
TOTAL PROTEIN: 6.7 G/DL (ref 6.4–8.2)
WBC # BLD: 7.9 K/UL (ref 4–11)

## 2020-03-24 PROCEDURE — 99214 OFFICE O/P EST MOD 30 MIN: CPT | Performed by: INTERNAL MEDICINE

## 2020-03-24 ASSESSMENT — PATIENT HEALTH QUESTIONNAIRE - PHQ9
2. FEELING DOWN, DEPRESSED OR HOPELESS: 0
SUM OF ALL RESPONSES TO PHQ9 QUESTIONS 1 & 2: 0
SUM OF ALL RESPONSES TO PHQ QUESTIONS 1-9: 0
SUM OF ALL RESPONSES TO PHQ QUESTIONS 1-9: 0
1. LITTLE INTEREST OR PLEASURE IN DOING THINGS: 0

## 2020-03-24 NOTE — PROGRESS NOTES
Abdomen:   Soft, non-tender, bowel sounds active all four quadrants,  no masses, no organomegaly   Skin: Skin color, texture, turgor normal, no rashes or lesions   Lymph nodes: Cervical, supraclavicular  Adenopathy is absent   Neurologic: No focal neurological deficits noted       No components found for: CHLPL  Lab Results   Component Value Date    TRIG 88 12/17/2019    TRIG 154 (H) 01/11/2019    TRIG 80 12/07/2016     Lab Results   Component Value Date    HDL 70 (H) 12/17/2019    HDL 72 (H) 01/11/2019    HDL 94 (H) 12/07/2016     Lab Results   Component Value Date    LDLCALC 98 12/17/2019    LDLCALC 127 (H) 01/11/2019    LDLCALC 117 (H) 12/07/2016     Lab Results   Component Value Date    LABVLDL 18 12/17/2019    LABVLDL 31 01/11/2019    LABVLDL 16 12/07/2016     Lab Results   Component Value Date    CREATININE <0.5 (L) 12/17/2019         ASSESSMENT/PLAN[de-identified]          I discussed the diagnosis of breast cancer with her and highly recommended that she give a good therapeutic trial of the Arimidex    Rationale for reduction in recurrence was discussed with her. Continue vitamin D supplementation    She has had an acute exacerbation of her chronic depression from the recent diagnosis, her Wellbutrin was increased per her surgeon and I advised her I concurred with this change. Advised against the use of Q-tips, she currently has a laceration in her left external auditory canal which is causing her symptoms    Continue higher dose of antidepressant medication    . Diagnosis Orders   1. Malignant neoplasm of left breast in female, estrogen receptor positive, unspecified site of breast ([facility] [address]  Comprehensive Metabolic Panel    CBC Auto Differential   2. Primary insomnia     3. Hyperlipidemia, unspecified hyperlipidemia type     4. Vitamin D deficiency     5.  Laceration of left external auditory canal, initial encounter

## 2020-05-21 ENCOUNTER — TELEPHONE (OUTPATIENT)
Dept: INTERNAL MEDICINE CLINIC | Age: 70
End: 2020-05-21

## 2020-06-04 ENCOUNTER — OFFICE VISIT (OUTPATIENT)
Dept: INTERNAL MEDICINE CLINIC | Age: 70
End: 2020-06-04
Payer: MEDICARE

## 2020-06-04 VITALS
OXYGEN SATURATION: 97 % | TEMPERATURE: 98.8 F | BODY MASS INDEX: 45.08 KG/M2 | WEIGHT: 245 LBS | HEIGHT: 62 IN | DIASTOLIC BLOOD PRESSURE: 64 MMHG | SYSTOLIC BLOOD PRESSURE: 120 MMHG | HEART RATE: 90 BPM

## 2020-06-04 PROCEDURE — 99214 OFFICE O/P EST MOD 30 MIN: CPT | Performed by: INTERNAL MEDICINE

## 2020-06-04 RX ORDER — NYSTATIN 100000 [USP'U]/G
POWDER TOPICAL 2 TIMES DAILY
Qty: 45 G | Refills: 2 | Status: SHIPPED | OUTPATIENT
Start: 2020-06-04 | End: 2020-12-15 | Stop reason: SDUPTHER

## 2020-06-15 ENCOUNTER — TELEPHONE (OUTPATIENT)
Dept: INTERNAL MEDICINE CLINIC | Age: 70
End: 2020-06-15

## 2020-07-27 ENCOUNTER — OFFICE VISIT (OUTPATIENT)
Dept: DERMATOLOGY | Age: 70
End: 2020-07-27
Payer: MEDICARE

## 2020-07-27 VITALS — TEMPERATURE: 97.9 F

## 2020-07-27 PROCEDURE — 99213 OFFICE O/P EST LOW 20 MIN: CPT | Performed by: DERMATOLOGY

## 2020-07-27 NOTE — PROGRESS NOTES
[facility]  Paulie Luna MD  [address]  1950    71 y.o. female     Date of Visit: 7/27/2020    Chief Complaint: moles/lesions, f/u skin cancer  Chief Complaint   Patient presents with    Skin Exam     Last seen: 7-2019   *diagnosed with breast cancer since last seen in 2019 - prepping for radiation  *hx laser for lentigines    History of Present Illness:    1. Hx of dermatitis - no recent flares. 2. Here for full skin check for scattered brown and red lesions on the trunk and extremities. Most have been present for many years; no change in size/shape/color of any lesions; no bleeding lesions. No personal hx of skin cancer; grandparent and maternal uncle with hx of NMSC. 3. Hx of NMSC - BCC on the R nasal tip, s/p Mohs in 2016 by Dr. Livia Randhawa. She is unhappy with scar appearance but otw no concerns. Hx of papule on the R medial cheek. It is asx. Noted at 2014 visit and is no bigger. C/w [address]. 4. Previously discussed brown lesions on the dorsum of the hands and cheeks - may be interested in removal.    5. She has intermittent flares of cold sores on the lips. No recent flares. She sees Ashutosh Richardson at [facility] - previously using:  - IS Clinical Lightening Complex and Serum (PM)  - IS Extra Protect SPF 30  - [employer] Refinishing and Transforming Complex    Review of Systems:  Gen: Feels well, good sense of health. Skin: No changing moles or lesions. Past Medical History, Family History, Surgical History, Medications and Allergies reviewed.     Past Medical History:   Diagnosis Date    Basal cell carcinoma     Depression     Obesity     Post-menopausal        Past Surgical History:   Procedure Laterality Date    KNEE ARTHROSCOPY  5/2004    left knee    KNEE SURGERY  5/2005    lt TKR    MOHS SURGERY         Outpatient Medications Marked as Taking for the 7/27/20 encounter (Office Visit) with Jacinto Ambrose MD   Medication Sig Dispense Refill    nystatin (MYCOSTATIN) 087247 UNIT/GM powder Apply topically 2 times daily Apply 3 times daily. 45 g 2    buPROPion (WELLBUTRIN XL) 150 MG extended release tablet TAKE 1 TABLET EVERY MORNING 90 tablet 3    diclofenac sodium 1 % GEL APPLY 2 GRAMS TOPICALLY FOUR TIMES A DAY  AS DIRECTED 5 Tube 3    omeprazole (PRILOSEC) 20 MG delayed release capsule Take 1 capsule by mouth Daily 90 capsule 3    naproxen (NAPROSYN) 500 MG tablet Take 1 tablet by mouth 2 times daily (with meals) 180 tablet 3    amitriptyline (ELAVIL) 25 MG tablet TAKE 1 TABLET NIGHTLY 90 tablet 4    albuterol sulfate HFA (PROAIR HFA) 108 (90 Base) MCG/ACT inhaler Inhale 2 puffs into the lungs every 6 hours as needed for Wheezing 1 Inhaler 5    vitamin B-12 (CYANOCOBALAMIN) 100 MCG tablet Take 50 mcg by mouth daily.  Vitamin D (CHOLECALCIFEROL) 1000 UNITS CAPS capsule Take 1,250 Units by mouth daily.  Biotin 5000 MCG CAPS Take  by mouth.  calcium carbonate 600 MG TABS tablet Take 1 tablet by mouth daily. Allergies   Allergen Reactions    Cephalosporins     Cymbalta [Duloxetine Hcl]      Effects mood, vision and mind    Keflex [Cephalexin] Nausea Only    Lyrica [Pregabalin]      Double vision, feet and hand swelling    Vioxx     Codeine Nausea And Vomiting         Physical Examination     Gen, NAD  The following were examined and determined to be normal: Psych/Neuro, Scalp/hair, Head/face, Conjunctivae/eyelids, Gums/teeth/lips, Neck, Breast/axilla/chest, Abdomen, Back, Nails/digits and buttocks. The following were examined and determined to be abnormal: RUE, LUE, RLE and LLE. Finger. 1. R middle finger with scaly pink patch  2. trunk and extremities with scattered brown and red macules and papules   Chest, arm and L temple with dull brown macules  Legs with grayish-tan stuck-on dull 2-3 mm papules  3. Depressed round scar on the R nasal tip - clear  4.  Dorsum of the hands with tan macules; cheeks with

## 2020-08-28 ENCOUNTER — OFFICE VISIT (OUTPATIENT)
Dept: INTERNAL MEDICINE CLINIC | Age: 70
End: 2020-08-28
Payer: MEDICARE

## 2020-08-28 VITALS
OXYGEN SATURATION: 97 % | HEIGHT: 63 IN | DIASTOLIC BLOOD PRESSURE: 80 MMHG | SYSTOLIC BLOOD PRESSURE: 140 MMHG | TEMPERATURE: 97 F | WEIGHT: 240 LBS | HEART RATE: 92 BPM | BODY MASS INDEX: 42.52 KG/M2

## 2020-08-28 LAB
ALBUMIN SERPL-MCNC: 4.5 G/DL (ref 3.4–5)
ANION GAP SERPL CALCULATED.3IONS-SCNC: 13 MMOL/L (ref 3–16)
BASOPHILS ABSOLUTE: 0 K/UL (ref 0–0.2)
BASOPHILS RELATIVE PERCENT: 0.4 %
BILIRUBIN URINE: NEGATIVE
BLOOD, URINE: NEGATIVE
BUN BLDV-MCNC: 20 MG/DL (ref 7–20)
CALCIUM SERPL-MCNC: 9.7 MG/DL (ref 8.3–10.6)
CHLORIDE BLD-SCNC: 100 MMOL/L (ref 99–110)
CLARITY: CLEAR
CO2: 25 MMOL/L (ref 21–32)
COLOR: YELLOW
CREAT SERPL-MCNC: <0.5 MG/DL (ref 0.6–1.2)
EOSINOPHILS ABSOLUTE: 0.1 K/UL (ref 0–0.6)
EOSINOPHILS RELATIVE PERCENT: 2.2 %
GFR AFRICAN AMERICAN: >60
GFR NON-AFRICAN AMERICAN: >60
GLUCOSE BLD-MCNC: 85 MG/DL (ref 70–99)
GLUCOSE URINE: NEGATIVE MG/DL
HCT VFR BLD CALC: 42.4 % (ref 36–48)
HEMOGLOBIN: 13.9 G/DL (ref 12–16)
KETONES, URINE: NEGATIVE MG/DL
LEUKOCYTE ESTERASE, URINE: NEGATIVE
LYMPHOCYTES ABSOLUTE: 0.6 K/UL (ref 1–5.1)
LYMPHOCYTES RELATIVE PERCENT: 11.5 %
MCH RBC QN AUTO: 33 PG (ref 26–34)
MCHC RBC AUTO-ENTMCNC: 32.9 G/DL (ref 31–36)
MCV RBC AUTO: 100.5 FL (ref 80–100)
MICROSCOPIC EXAMINATION: NORMAL
MONOCYTES ABSOLUTE: 0.7 K/UL (ref 0–1.3)
MONOCYTES RELATIVE PERCENT: 14.6 %
NEUTROPHILS ABSOLUTE: 3.6 K/UL (ref 1.7–7.7)
NEUTROPHILS RELATIVE PERCENT: 71.3 %
NITRITE, URINE: NEGATIVE
PDW BLD-RTO: 13.9 % (ref 12.4–15.4)
PH UA: 6.5 (ref 5–8)
PHOSPHORUS: 4.4 MG/DL (ref 2.5–4.9)
PLATELET # BLD: 174 K/UL (ref 135–450)
PMV BLD AUTO: 7.6 FL (ref 5–10.5)
POTASSIUM SERPL-SCNC: 4.4 MMOL/L (ref 3.5–5.1)
PROTEIN UA: NEGATIVE MG/DL
RBC # BLD: 4.22 M/UL (ref 4–5.2)
SODIUM BLD-SCNC: 138 MMOL/L (ref 136–145)
SPECIFIC GRAVITY UA: 1.01 (ref 1–1.03)
URINE TYPE: NORMAL
UROBILINOGEN, URINE: 0.2 E.U./DL
WBC # BLD: 5.1 K/UL (ref 4–11)

## 2020-08-28 PROCEDURE — 93000 ELECTROCARDIOGRAM COMPLETE: CPT | Performed by: INTERNAL MEDICINE

## 2020-08-28 PROCEDURE — 99214 OFFICE O/P EST MOD 30 MIN: CPT | Performed by: INTERNAL MEDICINE

## 2020-08-28 RX ORDER — ALBUTEROL SULFATE 90 UG/1
2 AEROSOL, METERED RESPIRATORY (INHALATION)
COMMUNITY
End: 2020-12-15 | Stop reason: SDUPTHER

## 2020-08-28 RX ORDER — OMEPRAZOLE 20 MG/1
20 CAPSULE, DELAYED RELEASE ORAL DAILY
COMMUNITY
Start: 2019-12-16 | End: 2020-12-15 | Stop reason: SDUPTHER

## 2020-08-28 NOTE — PROGRESS NOTES
Chief Complaint   Patient presents with    Pre-op Exam     right knee repair at [facility]   9/4/20       Jesús Bright 71 y.o. female is here for preoperative assessment some chronic obstructive pulmonary disease prior history of hypertension recent treatment for breast cancer including radiation therapy. She advises me that she had 1 of 2+ nodes    She was placed on Arimidex however had unacceptable side effects and this was discontinued    She then described \"mobility problems\". 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 At this point in time she complains of  a rash under her breast.  She has tried multiple different remedies    including steroids, over-the-counter Lotrimin, Benadryl cream etc. and continues having the rash    The rash is erythematous and typical for intertrigo      Review of systems the patient has had previous surgical procedures and has had no anesthesia complications. Current Outpatient Medications   Medication Sig Dispense Refill    omeprazole (PRILOSEC) 20 MG delayed release capsule Take 20 mg by mouth daily      albuterol sulfate HFA (PROVENTIL HFA) 108 (90 Base) MCG/ACT inhaler Inhale 2 puffs into the lungs      nystatin (MYCOSTATIN) 673863 UNIT/GM powder Apply topically 2 times daily Apply 3 times daily.  45 g 2    buPROPion (WELLBUTRIN XL) 150 MG extended release tablet TAKE 1 TABLET EVERY MORNING 90 tablet 3    diclofenac sodium 1 % GEL APPLY 2 GRAMS TOPICALLY FOUR TIMES A DAY  AS DIRECTED 5 Tube 3    naproxen (NAPROSYN) 500 MG tablet Take 1 tablet by mouth 2 times daily (with meals) 180 tablet 3    amitriptyline (ELAVIL) 25 MG tablet TAKE 1 TABLET NIGHTLY 90 tablet 4    clobetasol (TEMOVATE) 0.05 % ointment 12/07/2016     Lab Results   Component Value Date    HDL 70 (H) 12/17/2019    HDL 72 (H) 01/11/2019    HDL 94 (H) 12/07/2016     Lab Results   Component Value Date    LDLCALC 98 12/17/2019    LDLCALC 127 (H) 01/11/2019    LDLCALC 117 (H) 12/07/2016     Lab Results   Component Value Date    LABVLDL 18 12/17/2019    LABVLDL 31 01/11/2019    LABVLDL 16 12/07/2016     Lab Results   Component Value Date    CREATININE 0.5 (L) 03/24/2020           ASSESSMENT/PLAN[de-identified]  I see no medical contraindication to the planned surgical procedure    I advised her that her described mobility problems more likely related to her failed total knee than the Arimidex. Given her positive note I advised her it was important that she follow the recommendations of her oncologist regarding adjuvant therapy for her breast cancer. Laboratory as requested by surgery performed, MRSA screening performed,    Barring any unforeseen laboratory abnormalities I see no medical contraindication to the planned surgical procedure   Diagnosis Orders   1. Hyperlipidemia, unspecified hyperlipidemia type     2. Pre-op exam  EKG 12 Lead    CBC Auto Differential    Renal Function Panel   3. Dysuria  Urinalysis    Culture, MRSA, Screening   4. Vitamin D deficiency     5. Malignant neoplasm of left breast in female, estrogen receptor positive, unspecified site of breast ([facility] [address]     6.  Failure of total knee replacement, initial encounter ([facility] [address]

## 2020-08-31 ENCOUNTER — TELEPHONE (OUTPATIENT)
Dept: INTERNAL MEDICINE CLINIC | Age: 70
End: 2020-08-31

## 2020-08-31 LAB — MRSA CULTURE ONLY: NORMAL

## 2020-11-16 ENCOUNTER — TELEPHONE (OUTPATIENT)
Dept: INTERNAL MEDICINE CLINIC | Age: 70
End: 2020-11-16

## 2020-11-16 NOTE — TELEPHONE ENCOUNTER
Pt calling wanting to get an order for a Dexa Scan---and wants to talk to you. Please call the pt. Thanks.

## 2020-11-16 NOTE — TELEPHONE ENCOUNTER
Order for DXA written   Pt advised   Gave # to [facility]     Now wondering about a bone scan   Advised to see what oncologist wants to order

## 2020-12-15 ENCOUNTER — OFFICE VISIT (OUTPATIENT)
Dept: INTERNAL MEDICINE CLINIC | Age: 70
End: 2020-12-15
Payer: MEDICARE

## 2020-12-15 ENCOUNTER — TELEPHONE (OUTPATIENT)
Dept: INTERNAL MEDICINE CLINIC | Age: 70
End: 2020-12-15

## 2020-12-15 VITALS
BODY MASS INDEX: 42.88 KG/M2 | SYSTOLIC BLOOD PRESSURE: 120 MMHG | HEIGHT: 63 IN | HEART RATE: 72 BPM | DIASTOLIC BLOOD PRESSURE: 62 MMHG | WEIGHT: 242 LBS | TEMPERATURE: 96.6 F

## 2020-12-15 PROCEDURE — G0438 PPPS, INITIAL VISIT: HCPCS | Performed by: INTERNAL MEDICINE

## 2020-12-15 RX ORDER — AMITRIPTYLINE HYDROCHLORIDE 25 MG/1
TABLET, FILM COATED ORAL
Qty: 90 TABLET | Refills: 4 | Status: SHIPPED | OUTPATIENT
Start: 2020-12-15

## 2020-12-15 RX ORDER — ZOLPIDEM TARTRATE 10 MG/1
5 TABLET ORAL NIGHTLY PRN
Qty: 90 TABLET | Refills: 1 | Status: SHIPPED | OUTPATIENT
Start: 2020-12-15 | End: 2021-02-16 | Stop reason: SDUPTHER

## 2020-12-15 RX ORDER — FLUCONAZOLE 100 MG/1
100 TABLET ORAL DAILY
Qty: 7 TABLET | Refills: 0 | Status: SHIPPED | OUTPATIENT
Start: 2020-12-15 | End: 2020-12-22

## 2020-12-15 RX ORDER — BUPROPION HYDROCHLORIDE 150 MG/1
150 TABLET ORAL 2 TIMES DAILY
Qty: 180 TABLET | Refills: 3 | Status: SHIPPED | OUTPATIENT
Start: 2020-12-15

## 2020-12-15 RX ORDER — OMEPRAZOLE 20 MG/1
20 CAPSULE, DELAYED RELEASE ORAL DAILY
Qty: 90 CAPSULE | Refills: 3 | Status: SHIPPED | OUTPATIENT
Start: 2020-12-15

## 2020-12-15 RX ORDER — NYSTATIN 100000 [USP'U]/G
POWDER TOPICAL 2 TIMES DAILY
Qty: 45 G | Refills: 2 | Status: SHIPPED | OUTPATIENT
Start: 2020-12-15 | End: 2021-10-05 | Stop reason: SDUPTHER

## 2020-12-15 RX ORDER — ALBUTEROL SULFATE 90 UG/1
2 AEROSOL, METERED RESPIRATORY (INHALATION) EVERY 6 HOURS PRN
Qty: 1 INHALER | Refills: 2 | Status: SHIPPED | OUTPATIENT
Start: 2020-12-15 | End: 2021-10-04 | Stop reason: SDUPTHER

## 2020-12-15 ASSESSMENT — LIFESTYLE VARIABLES
HOW OFTEN DURING THE LAST YEAR HAVE YOU BEEN UNABLE TO REMEMBER WHAT HAPPENED THE NIGHT BEFORE BECAUSE YOU HAD BEEN DRINKING: 0
HOW OFTEN DURING THE LAST YEAR HAVE YOU NEEDED AN ALCOHOLIC DRINK FIRST THING IN THE MORNING TO GET YOURSELF GOING AFTER A NIGHT OF HEAVY DRINKING: 0
HAVE YOU OR SOMEONE ELSE BEEN INJURED AS A RESULT OF YOUR DRINKING: 0
HAS A RELATIVE, FRIEND, DOCTOR, OR ANOTHER HEALTH PROFESSIONAL EXPRESSED CONCERN ABOUT YOUR DRINKING OR SUGGESTED YOU CUT DOWN: 0
AUDIT-C TOTAL SCORE: 3
HOW MANY STANDARD DRINKS CONTAINING ALCOHOL DO YOU HAVE ON A TYPICAL DAY: 0
HOW OFTEN DO YOU HAVE A DRINK CONTAINING ALCOHOL: 3
AUDIT TOTAL SCORE: 3
HOW OFTEN DO YOU HAVE SIX OR MORE DRINKS ON ONE OCCASION: 0
HOW OFTEN DURING THE LAST YEAR HAVE YOU FAILED TO DO WHAT WAS NORMALLY EXPECTED FROM YOU BECAUSE OF DRINKING: 0
HOW OFTEN DURING THE LAST YEAR HAVE YOU FOUND THAT YOU WERE NOT ABLE TO STOP DRINKING ONCE YOU HAD STARTED: 0
HOW OFTEN DURING THE LAST YEAR HAVE YOU HAD A FEELING OF GUILT OR REMORSE AFTER DRINKING: 0

## 2020-12-15 ASSESSMENT — PATIENT HEALTH QUESTIONNAIRE - PHQ9
SUM OF ALL RESPONSES TO PHQ QUESTIONS 1-9: 0
2. FEELING DOWN, DEPRESSED OR HOPELESS: 0
1. LITTLE INTEREST OR PLEASURE IN DOING THINGS: 0
SUM OF ALL RESPONSES TO PHQ9 QUESTIONS 1 & 2: 0

## 2020-12-15 NOTE — PATIENT INSTRUCTIONS
Personalized Preventive Plan for Rebecca Salvador - 12/15/2020  Medicare offers a range of preventive health benefits. Some of the tests and screenings are paid in full while other may be subject to a deductible, co-insurance, and/or copay. Some of these benefits include a comprehensive review of your medical history including lifestyle, illnesses that may run in your family, and various assessments and screenings as appropriate. After reviewing your medical record and screening and assessments performed today your provider may have ordered immunizations, labs, imaging, and/or referrals for you. A list of these orders (if applicable) as well as your Preventive Care list are included within your After Visit Summary for your review. Other Preventive Recommendations:    · A preventive eye exam performed by an eye specialist is recommended every 1-2 years to screen for glaucoma; cataracts, macular degeneration, and other eye disorders. · A preventive dental visit is recommended every 6 months. · Try to get at least 150 minutes of exercise per week or 10,000 steps per day on a pedometer . · Order or download the FREE \"Exercise & Physical Activity: Your Everyday Guide\" from The [employer]. Call 6-158.133.2636 or search [employer] online. · You need 7304-7820 mg of calcium and 0174-9834 IU of vitamin D per day. It is possible to meet your calcium requirement with diet alone, but a vitamin D supplement is usually necessary to meet this goal.  · When exposed to the sun, use a sunscreen that protects against both UVA and UVB radiation with an SPF of 30 or greater. Reapply every 2 to 3 hours or after sweating, drying off with a towel, or swimming. · Always wear a seat belt when traveling in a car. Always wear a helmet when riding a bicycle or motorcycle.

## 2020-12-15 NOTE — TELEPHONE ENCOUNTER
----- Message from Nuno Castillo sent at 12/15/2020  4:03 PM EST -----  Subject: Message to Provider    QUESTIONS  Information for Provider? Pt would like to know what to take for thrush in   mouth. Pt finished antibiotics for cellulitis   and believes they developed thrush and would like to know how to proceed. ---------------------------------------------------------------------------  --------------  Erick Muse INFO  What is the best way for the office to contact you? OK to leave message on   voicemail  Preferred Call Back Phone Number? [phone]  ---------------------------------------------------------------------------  --------------  SCRIPT ANSWERS  Relationship to Patient?  Self

## 2020-12-15 NOTE — PROGRESS NOTES
Medicare Annual Wellness Visit  Name: Leldon Kussmaul Date: 12/15/2020   MRN: [id number] Sex: Female   Age: 79 y.o. Ethnicity: Non-/Non    : 1950 Race: Travon Verduzco is here for Medicare AWV    Screenings for behavioral, psychosocial and functional/safety risks, and cognitive dysfunction are all negative except as indicated below. These results, as well as other patient data from the [address] form, are documented in Flowsheets linked to this Encounter. Allergies   Allergen Reactions    Cephalosporins     Anastrozole Other (See Comments)     Joint pain and trouble walking. States, \"gives me awful joint pain/stiffness\"      Cymbalta [Duloxetine Hcl]      Effects mood, vision and mind    Hydrocodone-Acetaminophen      \"makes me sweaty and cry a lot\"    Keflex [Cephalexin] Nausea Only    Lyrica [Pregabalin]      Double vision, feet and hand swelling    Vioxx     Codeine Nausea And Vomiting       Prior to Visit Medications    Medication Sig Taking? Authorizing Provider   albuterol sulfate HFA (PROVENTIL HFA) 108 (90 Base) MCG/ACT inhaler Inhale 2 puffs into the lungs Yes Historical Provider, MD   omeprazole (PRILOSEC) 20 MG delayed release capsule Take 20 mg by mouth daily Yes Historical Provider, MD   nystatin (MYCOSTATIN) 613585 UNIT/GM powder Apply topically 2 times daily Apply 3 times daily.  Yes Cuate Holt MD   buPROPion (WELLBUTRIN XL) 150 MG extended release tablet TAKE 1 TABLET EVERY MORNING Yes Cuate Holt MD   diclofenac sodium 1 % GEL APPLY 2 GRAMS TOPICALLY FOUR TIMES A DAY  AS DIRECTED Yes Cuate Holt MD   naproxen (NAPROSYN) 500 MG tablet Take 1 tablet by mouth 2 times daily (with meals) Yes Cuate Holt MD   amitriptyline (ELAVIL) 25 MG tablet TAKE 1 TABLET NIGHTLY Yes Cuate Holt MD   ESTRACE VAGINAL 0.1 MG/GM vaginal cream  Yes Historical Provider, MD vitamin B-12 (CYANOCOBALAMIN) 100 MCG tablet Take 50 mcg by mouth daily. Yes Historical Provider, MD   Vitamin D (CHOLECALCIFEROL) 1000 UNITS CAPS capsule Take 1,250 Units by mouth daily. Yes Historical Provider, MD   Biotin 5000 MCG CAPS Take  by mouth. Yes Historical Provider, MD   calcium carbonate 600 MG TABS tablet Take 1 tablet by mouth daily. Yes Historical Provider, MD   valACYclovir (VALTREX) 1 g tablet 2 tabs po at first signs of flare and then 2 tabs po 12 hours later. Patient not taking: Reported on 2/27/2020  Val Walls MD       Past Medical History:   Diagnosis Date    Basal cell carcinoma     Depression     Obesity     Post-menopausal        Past Surgical History:   Procedure Laterality Date    KNEE ARTHROSCOPY  5/2004    left knee    KNEE SURGERY  5/2005    lt TKR    MOHS SURGERY         Family History   Problem Relation Age of Onset    Heart Attack Father     Diabetes Other     Cancer Maternal Uncle         bcc-mohs    Cancer Maternal Grandfather         skin cancer ? CareTeam (Including outside providers/suppliers regularly involved in providing care):   Patient Care Team:  Fernando Valera MD as PCP - Killian Chappell MD as PCP - [facility] Empaneled Provider    Wt Readings from Last 3 Encounters:   12/15/20 242 lb (109.8 kg)   08/28/20 240 lb (108.9 kg)   06/04/20 245 lb (111.1 kg)     Vitals:    12/15/20 1011   BP: 120/62   Pulse: 72   Temp: 96.6 °F (35.9 °C)   Weight: 242 lb (109.8 kg)   Height: 5' 3\" (1.6 m)     Body mass index is 42.87 kg/m². Based upon direct observation of the patient, evaluation of cognition reveals recent and remote memory intact.     General Appearance: alert and oriented to person, place and time, well developed and well- nourished, in no acute distress  Skin: warm and dry, no rash or erythema  Head: normocephalic and atraumatic  Eyes: pupils equal, round, and reactive to light, extraocular eye movements intact, conjunctivae normal Have you seen a dentist within the past year?: Yes  Body mass index: (!) 42.86  Health Habits/Nutrition Interventions:  · for file       Personalized Preventive Plan   Current Health Maintenance Status  Immunization History   Administered Date(s) Administered    Influenza Vaccine, unspecified formulation 10/26/2015    Influenza, High Dose (Fluzone 65 yrs and older) 11/02/2019, 10/19/2020    Pneumococcal Conjugate 13-valent (Nolon Pippa) 12/11/2017, 11/06/2020    Tdap (Boostrix, Adacel) 12/11/2017        Health Maintenance   Topic Date Due    Hepatitis C screen  1950    Shingles Vaccine (1 of 2) 11/25/2000    DEXA (modify frequency per FRAX score)  11/25/2005    Annual Wellness Visit (AWV)  06/23/2019    Pneumococcal 65+ years Vaccine (2 of 2 - PPSV23) 11/06/2021    Breast cancer screen  02/21/2022    Colon cancer screen colonoscopy  02/07/2024    Lipid screen  12/17/2024    DTaP/Tdap/Td vaccine (2 - Td) 12/11/2027    Flu vaccine  Completed    Hepatitis A vaccine  Aged Out    Hepatitis B vaccine  Aged Out    Hib vaccine  Aged Out    Meningococcal (ACWY) vaccine  Aged Out     Recommendations for [employer] Due: see orders and patient instructions/AVS.  . Recommended screening schedule for the next 5-10 years is provided to the patient in written form: see Patient Instructions/AVS.    Juan Antonio Gracia was seen today for medicare awv.     Diagnoses and all orders for this visit:    Routine general medical examination at a health care facility We discussed her cancer treatment. Apparently she was placed on Arimidex and another aromatase inhibitor. She stated that both of them caused her to be miserable. She advises me that she was told she had an 8% chance of recurrence without treatment and a 4% chance of recurrent with hormone blocking therapy. She was advised that tamoxifen would be an option. I advised her that given the 50% relative risk reduction that would be well worth the risk to try it to see if she could tolerate it.

## 2021-01-14 ENCOUNTER — TELEPHONE (OUTPATIENT)
Dept: INTERNAL MEDICINE CLINIC | Age: 71
End: 2021-01-14

## 2021-01-14 RX ORDER — NAPROXEN 500 MG/1
500 TABLET ORAL 2 TIMES DAILY WITH MEALS
Qty: 180 TABLET | Refills: 3 | Status: SHIPPED | OUTPATIENT
Start: 2021-01-14 | End: 2021-11-19

## 2021-01-14 RX ORDER — NAPROXEN 500 MG/1
500 TABLET ORAL 2 TIMES DAILY WITH MEALS
Qty: 180 TABLET | Refills: 3 | Status: SHIPPED | OUTPATIENT
Start: 2021-01-14 | End: 2021-01-14 | Stop reason: SDUPTHER

## 2021-01-14 NOTE — TELEPHONE ENCOUNTER
----- Message from Kandace Bethea sent at 1/14/2021 12:29 PM EST -----  Subject: Refill Request    QUESTIONS  Name of Medication? naproxen (NAPROSYN) 500 MG tablet  Patient-reported dosage and instructions? 500MG 2 times a day   How many days do you have left? 8  Preferred Pharmacy? [facility] #[id number]  Pharmacy phone number (if available)? [phone]  Additional Information for Provider? patient is needing her prescription. She has a weeks worth left but wants to go the rest called in for the   year. says that she just saw her PCP and he forgot to write her this   prescription   ---------------------------------------------------------------------------  --------------  [address]  What is the best way for the office to contact you? OK to leave message on   voicemail  Preferred Call Back Phone Number?  [phone]

## 2021-01-14 NOTE — TELEPHONE ENCOUNTER
Patient called back. She said she is going to see Darshan Chowdary. Patient asked to  the prescription tomorrow.

## 2021-01-15 ENCOUNTER — OFFICE VISIT (OUTPATIENT)
Dept: INTERNAL MEDICINE CLINIC | Age: 71
End: 2021-01-15
Payer: MEDICARE

## 2021-01-15 VITALS
WEIGHT: 242 LBS | OXYGEN SATURATION: 98 % | DIASTOLIC BLOOD PRESSURE: 60 MMHG | TEMPERATURE: 96.7 F | SYSTOLIC BLOOD PRESSURE: 140 MMHG | BODY MASS INDEX: 42.87 KG/M2 | HEART RATE: 90 BPM

## 2021-01-15 DIAGNOSIS — L03.119 CELLULITIS OF HAND: ICD-10-CM

## 2021-01-15 DIAGNOSIS — B37.0 THRUSH: ICD-10-CM

## 2021-01-15 DIAGNOSIS — S99.921A INJURY OF RIGHT FOOT, INITIAL ENCOUNTER: Primary | ICD-10-CM

## 2021-01-15 PROCEDURE — 4040F PNEUMOC VAC/ADMIN/RCVD: CPT | Performed by: NURSE PRACTITIONER

## 2021-01-15 PROCEDURE — 1036F TOBACCO NON-USER: CPT | Performed by: NURSE PRACTITIONER

## 2021-01-15 PROCEDURE — G8417 CALC BMI ABV UP PARAM F/U: HCPCS | Performed by: NURSE PRACTITIONER

## 2021-01-15 PROCEDURE — 1090F PRES/ABSN URINE INCON ASSESS: CPT | Performed by: NURSE PRACTITIONER

## 2021-01-15 PROCEDURE — G8427 DOCREV CUR MEDS BY ELIG CLIN: HCPCS | Performed by: NURSE PRACTITIONER

## 2021-01-15 PROCEDURE — G8482 FLU IMMUNIZE ORDER/ADMIN: HCPCS | Performed by: NURSE PRACTITIONER

## 2021-01-15 PROCEDURE — 99213 OFFICE O/P EST LOW 20 MIN: CPT | Performed by: NURSE PRACTITIONER

## 2021-01-15 PROCEDURE — 1123F ACP DISCUSS/DSCN MKR DOCD: CPT | Performed by: NURSE PRACTITIONER

## 2021-01-15 PROCEDURE — 3017F COLORECTAL CA SCREEN DOC REV: CPT | Performed by: NURSE PRACTITIONER

## 2021-01-15 PROCEDURE — G8400 PT W/DXA NO RESULTS DOC: HCPCS | Performed by: NURSE PRACTITIONER

## 2021-01-15 ASSESSMENT — PATIENT HEALTH QUESTIONNAIRE - PHQ9
SUM OF ALL RESPONSES TO PHQ9 QUESTIONS 1 & 2: 0
SUM OF ALL RESPONSES TO PHQ QUESTIONS 1-9: 0

## 2021-01-15 NOTE — PROGRESS NOTES
SUBJECTIVE:    Patient ID: Vijay Chowdary is a 79 y.o. female. CC: foot injury, hand cellulitis, thrush, ear wax    HPI: The patient presents to the office for an acute visit. Pedicure last month. She scraped side of her foot wearing flip flops. She reports foot is \"scraped\" and not healing properly. She had cellulitis of right hand. She reports this is improved. She was given doxycyline. She developed thrush. She was treated and improved but she has numbness on tongue. No ear symptoms but she wonders about ear wax. Current Outpatient Medications   Medication Sig Dispense Refill    naproxen (NAPROSYN) 500 MG tablet Take 1 tablet by mouth 2 times daily (with meals) 180 tablet 3    amitriptyline (ELAVIL) 25 MG tablet TAKE 1 TABLET NIGHTLY 90 tablet 4    buPROPion (WELLBUTRIN XL) 150 MG extended release tablet Take 1 tablet by mouth 2 times daily TAKE 1 TABLET EVERY MORNING 180 tablet 3    zolpidem (AMBIEN) 10 MG tablet Take 0.5 tablets by mouth nightly as needed for Sleep for up to 180 days. 90 tablet 1    omeprazole (PRILOSEC) 20 MG delayed release capsule Take 1 capsule by mouth daily 90 capsule 3    nystatin (MYCOSTATIN) 844075 UNIT/GM powder Apply topically 2 times daily Apply 3 times daily. 45 g 2    albuterol sulfate HFA (PROVENTIL HFA) 108 (90 Base) MCG/ACT inhaler Inhale 2 puffs into the lungs every 6 hours as needed for Wheezing 1 Inhaler 2    diclofenac sodium 1 % GEL APPLY 2 GRAMS TOPICALLY FOUR TIMES A DAY  AS DIRECTED 5 Tube 3    valACYclovir (VALTREX) 1 g tablet 2 tabs po at first signs of flare and then 2 tabs po 12 hours later. (Patient not taking: Reported on 2/27/2020) 16 tablet 2    ESTRACE VAGINAL 0.1 MG/GM vaginal cream       vitamin B-12 (CYANOCOBALAMIN) 100 MCG tablet Take 50 mcg by mouth daily.  Vitamin D (CHOLECALCIFEROL) 1000 UNITS CAPS capsule Take 1,250 Units by mouth daily.  Biotin 5000 MCG CAPS Take  by mouth.  calcium carbonate 600 MG TABS tablet Take 1 tablet by mouth daily. No current facility-administered medications for this visit. Review of Systems   Constitutional: Negative. Negative for fever. HENT: Positive for hearing loss. Negative for ear discharge. Respiratory: Negative. Cardiovascular: Negative. Gastrointestinal: Negative. Genitourinary: Negative. Musculoskeletal: Positive for arthralgias. Skin: Positive for wound. Neurological: Negative. OBJECTIVE:  Physical Exam  Vitals signs reviewed. Constitutional:       General: She is not in acute distress. Appearance: She is well-developed. She is not diaphoretic. HENT:      Head: Normocephalic and atraumatic. Eyes:      General: No scleral icterus. Conjunctiva/sclera: Conjunctivae normal.   Neck:      Musculoskeletal: Neck supple. Vascular: No JVD. Cardiovascular:      Rate and Rhythm: Normal rate and regular rhythm. Pulmonary:      Effort: Pulmonary effort is normal. No respiratory distress. Breath sounds: Normal breath sounds. No wheezing. Abdominal:      General: There is no distension. Palpations: Abdomen is soft. Tenderness: There is no abdominal tenderness. There is no guarding or rebound. Musculoskeletal: Normal range of motion. Comments: On the lateral aspect of the right foot, there is a nickel sized, raised, red nodule. This nodule has some crusting over the surface consistent with a possible past injury. There is induration but no fluctuance. There is no warmth. There are no red streaks. Skin:     General: Skin is warm and dry. Neurological:      Mental Status: She is alert and oriented to person, place, and time. Psychiatric:         Behavior: Behavior normal.         Thought Content:  Thought content normal.        BP (!) 140/60   Pulse 90   Temp 96.7 °F (35.9 °C)   Wt 242 lb (109.8 kg)   SpO2 98%   BMI 42.87 kg/m² PHQ Scores 1/15/2021 12/15/2020 3/24/2020 12/16/2019 12/11/2018 12/11/2017   PHQ2 Score 0 0 0 0 0 0   PHQ9 Score 0 0 0 0 0 0     Interpretation of Total Score Depression Severity: 1-4 = Minimal depression, 5-9 = Mild depression, 10-14 = Moderate depression, 15-19 = Moderately severe depression, 20-27 =Severe depression      ASSESSMENT/PLAN:  Shailesh Concepcion was seen today for other and thrush. Diagnoses and all orders for this visit:    Injury of right foot, initial encounter  -Scraped foot on her driveway. There is a raised nodule on the lateral aspect of the foot with some crusting. There is no current evidence of secondary infection.   Try warm soaks and monitor for worsening     Cellulitis of hand  -Resolved    Thrush  -Resolved      Ethel Lyn, APRN - CNP

## 2021-01-18 ENCOUNTER — TELEPHONE (OUTPATIENT)
Dept: INTERNAL MEDICINE CLINIC | Age: 71
End: 2021-01-18

## 2021-01-18 RX ORDER — SULFAMETHOXAZOLE AND TRIMETHOPRIM 800; 160 MG/1; MG/1
1 TABLET ORAL 2 TIMES DAILY
Qty: 14 TABLET | Refills: 0 | Status: SHIPPED | OUTPATIENT
Start: 2021-01-18 | End: 2021-01-25

## 2021-01-18 NOTE — TELEPHONE ENCOUNTER
Bactrim sent to pharmacy. Take 1 twice daily for one week.   Follow-up this or next week for evaluation, sooner if worsening despite treatment

## 2021-01-18 NOTE — TELEPHONE ENCOUNTER
Patient saw Cassandra Chavarria for non healing injury to foot. She states she soaked foot in [place] yesterday twice. This morning foot is worse, swollen, very red and spreading. She states in December had place on hand that was diagnosed as cellulitis and was on antibiotics. Patient also wanted to have pharmacy updated to [address] on [facility] in [place].

## 2021-01-19 ASSESSMENT — ENCOUNTER SYMPTOMS
RESPIRATORY NEGATIVE: 1
GASTROINTESTINAL NEGATIVE: 1

## 2021-01-26 ENCOUNTER — TELEPHONE (OUTPATIENT)
Dept: INTERNAL MEDICINE CLINIC | Age: 71
End: 2021-01-26

## 2021-01-26 NOTE — TELEPHONE ENCOUNTER
Patient states she has new prescription insurance (card is scanned in media). It is Mame Montero [place] 429. She states she received a letter from [facility] that the zolpidem 10 mg requires a prior authorization. Mame TEJEDA phone #[phone]. Dr Jus Duran wrote the prescription for her before he retired. Patient uses [facility] on [facility]. She asked for [facility] to call her back.

## 2021-01-27 NOTE — TELEPHONE ENCOUNTER
Patient states she called [facility] to have them send the PA info. Also states the script should say 1 tablet daily.

## 2021-01-28 NOTE — TELEPHONE ENCOUNTER
I have contacted the insurance company to initiate the PA.  All questions have been answered and was given a reference # [id number]    Will hear within 72 hours if this has been approved

## 2021-02-16 DIAGNOSIS — F51.01 PRIMARY INSOMNIA: ICD-10-CM

## 2021-02-18 RX ORDER — ZOLPIDEM TARTRATE 10 MG/1
10 TABLET ORAL NIGHTLY PRN
Qty: 90 TABLET | Refills: 1 | Status: SHIPPED | OUTPATIENT
Start: 2021-02-18 | End: 2021-08-19

## 2021-03-01 LAB
HPV COMMENT: NORMAL
HPV TYPE 16: NOT DETECTED
HPV TYPE 18: NOT DETECTED
HPVOH (OTHER TYPES): NOT DETECTED

## 2021-03-12 ENCOUNTER — TELEPHONE (OUTPATIENT)
Dept: INTERNAL MEDICINE CLINIC | Age: 71
End: 2021-03-12

## 2021-07-29 ENCOUNTER — OFFICE VISIT (OUTPATIENT)
Dept: DERMATOLOGY | Age: 71
End: 2021-07-29
Payer: MEDICARE

## 2021-07-29 VITALS — TEMPERATURE: 98.2 F

## 2021-07-29 DIAGNOSIS — L82.1 SEBORRHEIC KERATOSIS: ICD-10-CM

## 2021-07-29 DIAGNOSIS — L81.4 LENTIGINES: ICD-10-CM

## 2021-07-29 DIAGNOSIS — Z85.828 HISTORY OF NONMELANOMA SKIN CANCER: ICD-10-CM

## 2021-07-29 DIAGNOSIS — D18.01 HEMANGIOMA OF SKIN: ICD-10-CM

## 2021-07-29 DIAGNOSIS — B00.9 HSV INFECTION: ICD-10-CM

## 2021-07-29 DIAGNOSIS — L02.92 FURUNCLE: Primary | ICD-10-CM

## 2021-07-29 DIAGNOSIS — Z87.2 HISTORY OF ACUTE DERMATITIS: ICD-10-CM

## 2021-07-29 DIAGNOSIS — D22.9 MULTIPLE NEVI: ICD-10-CM

## 2021-07-29 DIAGNOSIS — L57.0 AK (ACTINIC KERATOSIS): ICD-10-CM

## 2021-07-29 PROCEDURE — 1123F ACP DISCUSS/DSCN MKR DOCD: CPT | Performed by: DERMATOLOGY

## 2021-07-29 PROCEDURE — G8400 PT W/DXA NO RESULTS DOC: HCPCS | Performed by: DERMATOLOGY

## 2021-07-29 PROCEDURE — G8417 CALC BMI ABV UP PARAM F/U: HCPCS | Performed by: DERMATOLOGY

## 2021-07-29 PROCEDURE — 3017F COLORECTAL CA SCREEN DOC REV: CPT | Performed by: DERMATOLOGY

## 2021-07-29 PROCEDURE — G8427 DOCREV CUR MEDS BY ELIG CLIN: HCPCS | Performed by: DERMATOLOGY

## 2021-07-29 PROCEDURE — 1090F PRES/ABSN URINE INCON ASSESS: CPT | Performed by: DERMATOLOGY

## 2021-07-29 PROCEDURE — 1036F TOBACCO NON-USER: CPT | Performed by: DERMATOLOGY

## 2021-07-29 PROCEDURE — 4040F PNEUMOC VAC/ADMIN/RCVD: CPT | Performed by: DERMATOLOGY

## 2021-07-29 PROCEDURE — 99214 OFFICE O/P EST MOD 30 MIN: CPT | Performed by: DERMATOLOGY

## 2021-07-29 PROCEDURE — 17000 DESTRUCT PREMALG LESION: CPT | Performed by: DERMATOLOGY

## 2021-07-29 NOTE — PATIENT INSTRUCTIONS
sunscreen with an SPF of at least 30 to exposed areas of the skin. Dont forget the ears and lips! Remember to reapply sunscreen about every 2 hours and after swimming or sweating. · Wear sun protective clothing. Swim shirts (aka. rash guards) are a great idea and negates the need to reapply sunscreen in those areas.      · Seek the shade whenever possible especially between the hours of 10 am and 4 pm when the suns rays are the strongest.     · Avoid tanning beds  ·

## 2021-07-29 NOTE — PROGRESS NOTES
[facility]  Ivana Wilder MD  [phone]      [address]  1950    79 y.o. female     Date of Visit: 7/29/2021    Chief Complaint: moles/lesions, f/u skin cancer  Chief Complaint   Patient presents with    Skin Exam     Last seen: 7-2020  *diagnosed with breast cancer in early 2020 - surg and radiation  *hx laser for lentigines    History of Present Illness:    1. Hx of dermatitis - no recent flares. 2. Here for full skin check for scattered brown and red lesions on the trunk and extremities. Most have been present for many years; no change in size/shape/color of any lesions; no bleeding lesions. No personal hx of skin cancer; grandparent and maternal uncle with hx of NMSC. 3. Hx of NMSC - BCC on the R nasal tip, s/p Mohs in 2016 by Dr. Terrence Garvey. She is unhappy with scar appearance but otw no concerns. Hx of papule on the R medial cheek. It is asx. Noted at 2014 visit and is no bigger. C/w [address]. 4. C/o brown lesions on the dorsum of the hands and cheeks - may be interested in removal for the face. 5. She has intermittent flares of cold sores on the lips. No recent flares. 6. C/o recent boils in the axilla - one on each side - treated with Bactrim and doxy and resolving but notes SE of candidiasis/burning tongue with abx - required trx with troches. 7. She has a rough spot on the nasal dorsum. Asx.    8. She has a purplish lesion on the R nasal bridge - present for many yearas. Asx. She sees Tasia Mcbride at [facility] - previously using:  - IS Clinical Lightening Complex and Serum (PM)  - IS Extra Protect SPF 30  - [employer] Refinishing and Transforming Complex    Review of Systems:  Gen: Feels well, good sense of health. Skin: No changing moles or lesions. Past Medical History, Family History, Surgical History, Medications and Allergies reviewed.     Past Medical History:   Diagnosis Date    Basal cell carcinoma     Depression     Obesity     Post-menopausal        Past Surgical History:   Procedure Laterality Date    KNEE ARTHROSCOPY  5/2004    left knee    KNEE SURGERY  5/2005    lt TKR    MOHS SURGERY         Outpatient Medications Marked as Taking for the 7/29/21 encounter (Office Visit) with Melba Bains MD   Medication Sig Dispense Refill    zolpidem (AMBIEN) 10 MG tablet Take 1 tablet by mouth nightly as needed for Sleep for up to 180 days. 90 tablet 1    naproxen (NAPROSYN) 500 MG tablet Take 1 tablet by mouth 2 times daily (with meals) 180 tablet 3    amitriptyline (ELAVIL) 25 MG tablet TAKE 1 TABLET NIGHTLY 90 tablet 4    buPROPion (WELLBUTRIN XL) 150 MG extended release tablet Take 1 tablet by mouth 2 times daily TAKE 1 TABLET EVERY MORNING 180 tablet 3    omeprazole (PRILOSEC) 20 MG delayed release capsule Take 1 capsule by mouth daily 90 capsule 3    nystatin (MYCOSTATIN) 962224 UNIT/GM powder Apply topically 2 times daily Apply 3 times daily. 45 g 2    diclofenac sodium 1 % GEL APPLY 2 GRAMS TOPICALLY FOUR TIMES A DAY  AS DIRECTED 5 Tube 3    valACYclovir (VALTREX) 1 g tablet 2 tabs po at first signs of flare and then 2 tabs po 12 hours later. 16 tablet 2    ESTRACE VAGINAL 0.1 MG/GM vaginal cream       vitamin B-12 (CYANOCOBALAMIN) 100 MCG tablet Take 50 mcg by mouth daily.  Vitamin D (CHOLECALCIFEROL) 1000 UNITS CAPS capsule Take 1,250 Units by mouth daily.  Biotin 5000 MCG CAPS Take  by mouth.  calcium carbonate 600 MG TABS tablet Take 1 tablet by mouth daily. Allergies   Allergen Reactions    Cephalosporins     Anastrozole Other (See Comments)     Joint pain and trouble walking.   States, \"gives me awful joint pain/stiffness\"      Cymbalta [Duloxetine Hcl]      Effects mood, vision and mind    Doxycycline Hyclate     Hydrocodone-Acetaminophen      \"makes me sweaty and cry a lot\"    Keflex [Cephalexin] Nausea Only    Lyrica [Pregabalin]      Double vision, feet and hand swelling    Sulfamethoxazole-Trimethoprim     Vioxx     Codeine Nausea And Vomiting         Physical Examination     Gen, NAD    FSE done today except for underwear-covered areas    1. R middle finger clear  2. trunk and extremities with scattered brown and red macules and papules   Tan macules on the face  Legs with grayish-tan stuck-on dull 2-3 mm papules  3. Depressed round scar on the R nasal tip - clear  4. Dorsum of the hands with tan macules; cheeks with tan macules  5. Lips clear  6. Erythematous resolving macule/thin papule in each axilla  7. Nasal dorsum with erythematous roughly scaled macule   8. R nasal bridge with purplish papule            Assessment and Plan     1. Hx of Dermatitis - R middle finger - clear today  - clobetasol oint bid; ed se/misuse    2. Benign-appearing nevi, SK's/stucco keratosis and angiomas  3. Hx of NMSC, no signs recurrence  - educ re ABCD's of MM   educ sun protection   encouraged skin check yearly (sooner if indicated), self checks    4. Lentigines on the hands and cheeks  - ed laser, 200/trx - IPL/GL for the face, 2-3 trx; ed mult trx, dyspig, erythema, downtime, new lesions  - ed peels for the face - VI - 250    5. HSV flares - lips - clear today - valtrex prn flares    6. axilla boils - r/o staph furunculosis  - check nasal swab  - bactroban if flaring - try to avoid systemic abx given SE    7. AK - nasal dorsum - 1  - lesion(s)  treated with liquid nitrogen x 2 cycles. Patient educated on risk of blister, hypopigmentation/scar and wound care. 8. 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

## 2021-07-31 LAB
GRAM STAIN RESULT: ABNORMAL
ORGANISM: ABNORMAL
WOUND/ABSCESS: ABNORMAL
WOUND/ABSCESS: ABNORMAL

## 2021-08-04 ENCOUNTER — TELEPHONE (OUTPATIENT)
Dept: INTERNAL MEDICINE CLINIC | Age: 71
End: 2021-08-04

## 2021-08-04 NOTE — TELEPHONE ENCOUNTER
Pt canceled her establish care appointment. Called pt to schedule an appointment as this has also not been filled since 2019. Pt refused to schedule an appointment at first stating she had already seen Dr Simon Vega to establish care and did not need to be seen again right now. Scheduled pt for next available new pt appointment.

## 2021-08-04 NOTE — TELEPHONE ENCOUNTER
----- Message from April Clay sent at 8/4/2021 12:26 PM EDT -----  Subject: Refill Request    QUESTIONS  Name of Medication? diclofenac sodium 1 % GEL  Patient-reported dosage and instructions? as needed   How many days do you have left? 0  [facility]erred [facility]? [id number]  Pharmacy phone number (if available)? [phone]  ---------------------------------------------------------------------------  --------------  [employer]  What is the best way for the office to contact you? OK to leave message on   voicemail  Preferred Call Back Phone Number?  [phone]

## 2021-08-24 ENCOUNTER — OFFICE VISIT (OUTPATIENT)
Dept: INTERNAL MEDICINE CLINIC | Age: 71
End: 2021-08-24
Payer: MEDICARE

## 2021-08-24 ENCOUNTER — TELEPHONE (OUTPATIENT)
Dept: INTERNAL MEDICINE CLINIC | Age: 71
End: 2021-08-24

## 2021-08-24 VITALS
OXYGEN SATURATION: 97 % | DIASTOLIC BLOOD PRESSURE: 60 MMHG | SYSTOLIC BLOOD PRESSURE: 138 MMHG | WEIGHT: 249 LBS | BODY MASS INDEX: 44.11 KG/M2 | HEART RATE: 90 BPM

## 2021-08-24 DIAGNOSIS — M54.2 CHRONIC NECK PAIN: ICD-10-CM

## 2021-08-24 DIAGNOSIS — G89.29 CHRONIC NECK PAIN: ICD-10-CM

## 2021-08-24 DIAGNOSIS — M25.512 CHRONIC PAIN OF BOTH SHOULDERS: ICD-10-CM

## 2021-08-24 DIAGNOSIS — G89.29 CHRONIC PAIN OF BOTH SHOULDERS: ICD-10-CM

## 2021-08-24 DIAGNOSIS — M15.9 PRIMARY OSTEOARTHRITIS INVOLVING MULTIPLE JOINTS: Primary | ICD-10-CM

## 2021-08-24 DIAGNOSIS — Z17.0 MALIGNANT NEOPLASM OF LEFT BREAST IN FEMALE, ESTROGEN RECEPTOR POSITIVE, UNSPECIFIED SITE OF BREAST (HCC): ICD-10-CM

## 2021-08-24 DIAGNOSIS — M25.511 CHRONIC PAIN OF BOTH SHOULDERS: ICD-10-CM

## 2021-08-24 DIAGNOSIS — C50.912 MALIGNANT NEOPLASM OF LEFT BREAST IN FEMALE, ESTROGEN RECEPTOR POSITIVE, UNSPECIFIED SITE OF BREAST (HCC): ICD-10-CM

## 2021-08-24 PROCEDURE — 1123F ACP DISCUSS/DSCN MKR DOCD: CPT | Performed by: NURSE PRACTITIONER

## 2021-08-24 PROCEDURE — 1036F TOBACCO NON-USER: CPT | Performed by: NURSE PRACTITIONER

## 2021-08-24 PROCEDURE — G8400 PT W/DXA NO RESULTS DOC: HCPCS | Performed by: NURSE PRACTITIONER

## 2021-08-24 PROCEDURE — 1090F PRES/ABSN URINE INCON ASSESS: CPT | Performed by: NURSE PRACTITIONER

## 2021-08-24 PROCEDURE — 4040F PNEUMOC VAC/ADMIN/RCVD: CPT | Performed by: NURSE PRACTITIONER

## 2021-08-24 PROCEDURE — G8427 DOCREV CUR MEDS BY ELIG CLIN: HCPCS | Performed by: NURSE PRACTITIONER

## 2021-08-24 PROCEDURE — 3017F COLORECTAL CA SCREEN DOC REV: CPT | Performed by: NURSE PRACTITIONER

## 2021-08-24 PROCEDURE — G8417 CALC BMI ABV UP PARAM F/U: HCPCS | Performed by: NURSE PRACTITIONER

## 2021-08-24 PROCEDURE — 99214 OFFICE O/P EST MOD 30 MIN: CPT | Performed by: NURSE PRACTITIONER

## 2021-08-24 ASSESSMENT — ENCOUNTER SYMPTOMS
RESPIRATORY NEGATIVE: 1
GASTROINTESTINAL NEGATIVE: 1

## 2021-08-24 NOTE — TELEPHONE ENCOUNTER
Pt called in stating the pharmacy called her to let her know that they can't refill the diclofenac sodium (VOLTAREN) 1 % GEL because the dispense quantity says 5g, and they don't sell it that way. Also patient said that her old dispense quantity said 100 g --1%.        Please call pharmacy to clarify

## 2021-08-24 NOTE — PROGRESS NOTES
SUBJECTIVE:    Patient ID: Oscar Wells is a 79 y.o. female. CC: Osteoarthritis, chronic neck pain, chronic shoulder pain    HPI: The patient presents to the office today for follow-up of chronic medical conditions and to establish with a new primary care provider after hers retired. She has a history of osteoarthritis and chronic neck and shoulder pain. In reviewing her problem list it appears most of her past issues have been musculoskeletal related including knee replacement, rotator cuff tendinitis and tear, shoulder pain, leg pain, numbness and tingling in the upper extremities, neck pain neck arthritis, myalgias. She is on amitriptyline. She also uses diclofenac gel as needed. She is requesting a refill for this. Past Medical History:   Diagnosis Date    Basal cell carcinoma     Depression     Obesity     Post-menopausal         Current Outpatient Medications   Medication Sig Dispense Refill    zolpidem (AMBIEN) 10 MG tablet TAKE 1 TABLET BY MOUTH NIGHTLY AS NEEDED FOR SLEEP FOR UP  DAYS.**3/3/21** 90 tablet 1    mupirocin (BACTROBAN) 2 % ointment Apply to affected area BID 22 g 1    naproxen (NAPROSYN) 500 MG tablet Take 1 tablet by mouth 2 times daily (with meals) 180 tablet 3    amitriptyline (ELAVIL) 25 MG tablet TAKE 1 TABLET NIGHTLY 90 tablet 4    buPROPion (WELLBUTRIN XL) 150 MG extended release tablet Take 1 tablet by mouth 2 times daily TAKE 1 TABLET EVERY MORNING 180 tablet 3    omeprazole (PRILOSEC) 20 MG delayed release capsule Take 1 capsule by mouth daily 90 capsule 3    nystatin (MYCOSTATIN) 227203 UNIT/GM powder Apply topically 2 times daily Apply 3 times daily.  45 g 2    albuterol sulfate HFA (PROVENTIL HFA) 108 (90 Base) MCG/ACT inhaler Inhale 2 puffs into the lungs every 6 hours as needed for Wheezing 1 Inhaler 2    diclofenac sodium 1 % GEL APPLY 2 GRAMS TOPICALLY FOUR TIMES A DAY  AS DIRECTED 5 Tube 3    valACYclovir (VALTREX) 1 g tablet 2 tabs po at Scores 1/15/2021 12/15/2020 3/24/2020 12/16/2019 12/11/2018 12/11/2017   PHQ2 Score 0 0 0 0 0 0   PHQ9 Score 0 0 0 0 0 0     Interpretation of Total Score Depression Severity: 1-4 = Minimal depression, 5-9 = Mild depression, 10-14 = Moderate depression, 15-19 = Moderately severe depression, 20-27 =Severe depression      ASSESSMENT/PLAN:  Soco Powell was seen today for shoulder pain. Diagnoses and all orders for this visit:    Primary osteoarthritis involving multiple joints  -     diclofenac sodium (VOLTAREN) 1 % GEL; Apply 2 g topically 4 times daily as needed for Pain    Chronic pain of both shoulders  -     diclofenac sodium (VOLTAREN) 1 % GEL; Apply 2 g topically 4 times daily as needed for Pain    Chronic neck pain  -     diclofenac sodium (VOLTAREN) 1 % GEL; Apply 2 g topically 4 times daily as needed for Pain    Malignant neoplasm of left breast in female, estrogen receptor positive, unspecified site of breast ([facility] [address]    -Annual blood work was completed earlier this year with her hematologist.  She did not take cholesterol or diabetes screening which we discussed. She would like to hold off for now and complete this next year with annual blood work. -Continue Voltaren for chronic musculoskeletal aches and pains.  -Follow-up as needed.         CRISSY Gaitan - CNP

## 2021-08-26 DIAGNOSIS — M25.511 CHRONIC PAIN OF BOTH SHOULDERS: ICD-10-CM

## 2021-08-26 DIAGNOSIS — M15.9 PRIMARY OSTEOARTHRITIS INVOLVING MULTIPLE JOINTS: ICD-10-CM

## 2021-08-26 DIAGNOSIS — M25.512 CHRONIC PAIN OF BOTH SHOULDERS: ICD-10-CM

## 2021-08-26 DIAGNOSIS — M54.2 CHRONIC NECK PAIN: ICD-10-CM

## 2021-08-26 DIAGNOSIS — G89.29 CHRONIC PAIN OF BOTH SHOULDERS: ICD-10-CM

## 2021-08-26 DIAGNOSIS — G89.29 CHRONIC NECK PAIN: ICD-10-CM

## 2021-10-01 ENCOUNTER — OFFICE VISIT (OUTPATIENT)
Dept: INTERNAL MEDICINE CLINIC | Age: 71
End: 2021-10-01
Payer: MEDICARE

## 2021-10-01 VITALS
DIASTOLIC BLOOD PRESSURE: 80 MMHG | BODY MASS INDEX: 44.11 KG/M2 | HEART RATE: 80 BPM | OXYGEN SATURATION: 99 % | SYSTOLIC BLOOD PRESSURE: 136 MMHG | TEMPERATURE: 97.3 F | WEIGHT: 249 LBS

## 2021-10-01 DIAGNOSIS — H66.93 BILATERAL OTITIS MEDIA, UNSPECIFIED OTITIS MEDIA TYPE: Primary | ICD-10-CM

## 2021-10-01 PROCEDURE — 1123F ACP DISCUSS/DSCN MKR DOCD: CPT | Performed by: NURSE PRACTITIONER

## 2021-10-01 PROCEDURE — 99213 OFFICE O/P EST LOW 20 MIN: CPT | Performed by: NURSE PRACTITIONER

## 2021-10-01 PROCEDURE — 4040F PNEUMOC VAC/ADMIN/RCVD: CPT | Performed by: NURSE PRACTITIONER

## 2021-10-01 PROCEDURE — 3017F COLORECTAL CA SCREEN DOC REV: CPT | Performed by: NURSE PRACTITIONER

## 2021-10-01 PROCEDURE — G8417 CALC BMI ABV UP PARAM F/U: HCPCS | Performed by: NURSE PRACTITIONER

## 2021-10-01 PROCEDURE — G8427 DOCREV CUR MEDS BY ELIG CLIN: HCPCS | Performed by: NURSE PRACTITIONER

## 2021-10-01 PROCEDURE — 1036F TOBACCO NON-USER: CPT | Performed by: NURSE PRACTITIONER

## 2021-10-01 PROCEDURE — 1090F PRES/ABSN URINE INCON ASSESS: CPT | Performed by: NURSE PRACTITIONER

## 2021-10-01 PROCEDURE — G8484 FLU IMMUNIZE NO ADMIN: HCPCS | Performed by: NURSE PRACTITIONER

## 2021-10-01 PROCEDURE — G8400 PT W/DXA NO RESULTS DOC: HCPCS | Performed by: NURSE PRACTITIONER

## 2021-10-01 RX ORDER — AMOXICILLIN AND CLAVULANATE POTASSIUM 875; 125 MG/1; MG/1
1 TABLET, FILM COATED ORAL 2 TIMES DAILY
Qty: 14 TABLET | Refills: 0 | Status: SHIPPED | OUTPATIENT
Start: 2021-10-01 | End: 2021-10-08

## 2021-10-01 ASSESSMENT — ENCOUNTER SYMPTOMS
COUGH: 1
SORE THROAT: 1
VOICE CHANGE: 1

## 2021-10-01 NOTE — PROGRESS NOTES
SUBJECTIVE:    Patient ID: Laura Lee is a 79 y.o. female. CC: Illness, ear pain    HPI: The patient presents to the office for an acute visit. She kept granddaughter who had cough. Patient reports cough, laryngitis, sore throat. She has bilateral ear pain. Symptoms for 3-4 days. No fever. She did salt water gargles. She took \"emergency\", [place]      Current Outpatient Medications   Medication Sig Dispense Refill    diclofenac sodium (VOLTAREN) 1 % GEL Apply 2 g topically 4 times daily as needed for Pain 5  100 gram tubes 500 g 5    zolpidem (AMBIEN) 10 MG tablet TAKE 1 TABLET BY MOUTH NIGHTLY AS NEEDED FOR SLEEP FOR UP  DAYS.**3/3/21** 90 tablet 1    mupirocin (BACTROBAN) 2 % ointment Apply to affected area BID 22 g 1    naproxen (NAPROSYN) 500 MG tablet Take 1 tablet by mouth 2 times daily (with meals) 180 tablet 3    amitriptyline (ELAVIL) 25 MG tablet TAKE 1 TABLET NIGHTLY 90 tablet 4    buPROPion (WELLBUTRIN XL) 150 MG extended release tablet Take 1 tablet by mouth 2 times daily TAKE 1 TABLET EVERY MORNING 180 tablet 3    omeprazole (PRILOSEC) 20 MG delayed release capsule Take 1 capsule by mouth daily 90 capsule 3    nystatin (MYCOSTATIN) 195620 UNIT/GM powder Apply topically 2 times daily Apply 3 times daily. 45 g 2    albuterol sulfate HFA (PROVENTIL HFA) 108 (90 Base) MCG/ACT inhaler Inhale 2 puffs into the lungs every 6 hours as needed for Wheezing 1 Inhaler 2    diclofenac sodium 1 % GEL APPLY 2 GRAMS TOPICALLY FOUR TIMES A DAY  AS DIRECTED 5 Tube 3    valACYclovir (VALTREX) 1 g tablet 2 tabs po at first signs of flare and then 2 tabs po 12 hours later. 16 tablet 2    ESTRACE VAGINAL 0.1 MG/GM vaginal cream       vitamin B-12 (CYANOCOBALAMIN) 100 MCG tablet Take 50 mcg by mouth daily.  Vitamin D (CHOLECALCIFEROL) 1000 UNITS CAPS capsule Take 1,250 Units by mouth daily.  Biotin 5000 MCG CAPS Take  by mouth.       calcium carbonate 600 MG TABS tablet Take 1 tablet by mouth daily. No current facility-administered medications for this visit. Review of Systems   Constitutional: Negative for fever. HENT: Positive for congestion, ear pain, sore throat and voice change. Respiratory: Positive for cough. OBJECTIVE:  Physical Exam  Constitutional:       Appearance: Normal appearance. HENT:      Head: Normocephalic and atraumatic. Right Ear: A middle ear effusion is present. Left Ear: Tympanic membrane is erythematous. Nose:      Right Sinus: No maxillary sinus tenderness or frontal sinus tenderness. Left Sinus: No maxillary sinus tenderness or frontal sinus tenderness. Mouth/Throat:      Lips: Pink. Mouth: Mucous membranes are moist.   Pulmonary:      Effort: Pulmonary effort is normal.      Breath sounds: Normal breath sounds. Neurological:      Mental Status: She is alert. /80   Pulse 80   Temp 97.3 °F (36.3 °C)   Wt 249 lb (112.9 kg)   SpO2 99%   BMI 44.11 kg/m²      PHQ Scores 1/15/2021 12/15/2020 3/24/2020 12/16/2019 12/11/2018 12/11/2017   PHQ2 Score 0 0 0 0 0 0   PHQ9 Score 0 0 0 0 0 0     Interpretation of Total Score Depression Severity: 1-4 = Minimal depression, 5-9 = Mild depression, 10-14 = Moderate depression, 15-19 = Moderately severe depression, 20-27 =Severe depression      ASSESSMENT/PLAN:  Rikki Schafer was seen today for otalgia. Diagnoses and all orders for this visit:    Bilateral otitis media, unspecified otitis media type  -     amoxicillin-clavulanate (AUGMENTIN) 875-125 MG per tablet; Take 1 tablet by mouth 2 times daily for 7 days    -URI symptoms for less than a week. Got sick after babysitting granddaughter so high likelihood of viral illness. However, her left ear is significantly reddened and the right ear is mildly reddened with effusion. We discussed the possibility of viral illness but she would like to proceed with treatment for bacterial ear infection.  Recommended Tylenol, warm compresses, continue salt water gargles for laryngitis      Lyn Garcia, APRN - CNP

## 2021-10-04 ENCOUNTER — TELEPHONE (OUTPATIENT)
Dept: INTERNAL MEDICINE CLINIC | Age: 71
End: 2021-10-04

## 2021-10-04 NOTE — TELEPHONE ENCOUNTER
Less than care home through treatment. As we discussed, if viral illness, antibiotic won't help. If bacterial illness, Augmentin should be effective but would need to complete the treatment. If she feels she is worsening, recommend re-evaluation because failure of Augmentin would be rare. If I am full, she can see someone with openings if urgent.

## 2021-10-04 NOTE — TELEPHONE ENCOUNTER
Patient is calling because she was prescribed Amoxicillin but claims they are not working and would like to be prescribed something else. Please send to [facility] on [address] in [place], please call her if something else is sent in.

## 2021-10-04 NOTE — TELEPHONE ENCOUNTER
Patient called and said she had two prescriptions and never got them filled because she still had medication, now that she ran out the scripts are  from last year.  Please send new prescriptions to pharmacy [facility]. Nystatin and Proventil

## 2021-10-04 NOTE — TELEPHONE ENCOUNTER
Pt advised as stated - offered appt and discussed possible viral infection which atb will not help   Pt will complete course of med and make appt if no better

## 2021-10-05 RX ORDER — NYSTATIN 100000 [USP'U]/G
POWDER TOPICAL 2 TIMES DAILY
Qty: 45 G | Refills: 2 | Status: SHIPPED | OUTPATIENT
Start: 2021-10-05

## 2021-10-05 RX ORDER — ALBUTEROL SULFATE 90 UG/1
2 AEROSOL, METERED RESPIRATORY (INHALATION) EVERY 6 HOURS PRN
Qty: 1 EACH | Refills: 3 | Status: SHIPPED | OUTPATIENT
Start: 2021-10-05 | End: 2022-01-31

## 2021-11-19 RX ORDER — NAPROXEN 500 MG/1
TABLET ORAL
Qty: 180 TABLET | Refills: 3 | Status: SHIPPED | OUTPATIENT
Start: 2021-11-19

## 2021-12-03 ENCOUNTER — TELEPHONE (OUTPATIENT)
Dept: INTERNAL MEDICINE CLINIC | Age: 71
End: 2021-12-03

## 2021-12-03 NOTE — TELEPHONE ENCOUNTER
Pt calling has been sick off and on for the last 10 weeks ---a lot of meds ---prednisone---went to get anils done other day and her nails are lifting from the nail beds ---wondering if her meds and conditions have something to do with this problem. Please call pt cell. Thanks.

## 2022-01-31 ENCOUNTER — OFFICE VISIT (OUTPATIENT)
Dept: DERMATOLOGY | Age: 72
End: 2022-01-31
Payer: MEDICARE

## 2022-01-31 VITALS — TEMPERATURE: 97.2 F

## 2022-01-31 DIAGNOSIS — L82.1 SEBORRHEIC KERATOSIS: ICD-10-CM

## 2022-01-31 DIAGNOSIS — D22.9 MULTIPLE NEVI: ICD-10-CM

## 2022-01-31 DIAGNOSIS — L02.92 FURUNCLE: ICD-10-CM

## 2022-01-31 DIAGNOSIS — D18.01 HEMANGIOMA OF SKIN: ICD-10-CM

## 2022-01-31 DIAGNOSIS — B00.9 HSV INFECTION: ICD-10-CM

## 2022-01-31 DIAGNOSIS — L57.0 AK (ACTINIC KERATOSIS): ICD-10-CM

## 2022-01-31 DIAGNOSIS — L30.9 DERMATITIS: Primary | ICD-10-CM

## 2022-01-31 DIAGNOSIS — Z85.828 HISTORY OF NONMELANOMA SKIN CANCER: ICD-10-CM

## 2022-01-31 DIAGNOSIS — L81.4 LENTIGINES: ICD-10-CM

## 2022-01-31 PROCEDURE — G8484 FLU IMMUNIZE NO ADMIN: HCPCS | Performed by: DERMATOLOGY

## 2022-01-31 PROCEDURE — G8400 PT W/DXA NO RESULTS DOC: HCPCS | Performed by: DERMATOLOGY

## 2022-01-31 PROCEDURE — 1036F TOBACCO NON-USER: CPT | Performed by: DERMATOLOGY

## 2022-01-31 PROCEDURE — 1123F ACP DISCUSS/DSCN MKR DOCD: CPT | Performed by: DERMATOLOGY

## 2022-01-31 PROCEDURE — 99214 OFFICE O/P EST MOD 30 MIN: CPT | Performed by: DERMATOLOGY

## 2022-01-31 PROCEDURE — G8417 CALC BMI ABV UP PARAM F/U: HCPCS | Performed by: DERMATOLOGY

## 2022-01-31 PROCEDURE — G8427 DOCREV CUR MEDS BY ELIG CLIN: HCPCS | Performed by: DERMATOLOGY

## 2022-01-31 PROCEDURE — 3017F COLORECTAL CA SCREEN DOC REV: CPT | Performed by: DERMATOLOGY

## 2022-01-31 PROCEDURE — 4040F PNEUMOC VAC/ADMIN/RCVD: CPT | Performed by: DERMATOLOGY

## 2022-01-31 PROCEDURE — 1090F PRES/ABSN URINE INCON ASSESS: CPT | Performed by: DERMATOLOGY

## 2022-01-31 RX ORDER — AZELASTINE 1 MG/ML
SPRAY, METERED NASAL
COMMUNITY
Start: 2021-11-11

## 2022-01-31 RX ORDER — CLOTRIMAZOLE 10 MG/1
10 LOZENGE ORAL; TOPICAL 3 TIMES DAILY
COMMUNITY
Start: 2022-01-21

## 2022-01-31 RX ORDER — BUDESONIDE AND FORMOTEROL FUMARATE DIHYDRATE 160; 4.5 UG/1; UG/1
2 AEROSOL RESPIRATORY (INHALATION) 2 TIMES DAILY
COMMUNITY
Start: 2022-01-21

## 2022-01-31 RX ORDER — SODIUM CHLORIDE FOR INHALATION 3 %
3 VIAL, NEBULIZER (ML) INHALATION 2 TIMES DAILY
COMMUNITY
Start: 2021-12-02

## 2022-01-31 NOTE — PROGRESS NOTES
[facility]  Paco Swan MD  [address]  1950    70 y.o. female     Date of Visit: 1/31/2022    Chief Complaint: moles/lesions, f/u skin cancer  Chief Complaint   Patient presents with    Skin Lesion     Last seen: 7-2021  *diagnosed with breast cancer in early 2020 - surg and radiation  *hx laser for lentigines    History of Present Illness:    1. Hx of dermatitis - flaring recently on the finger. 2. Here for full skin check for scattered brown and red lesions on the trunk and extremities. Most have been present for many years; no change in size/shape/color of any lesions; no bleeding lesions. No personal hx of skin cancer; grandparent and maternal uncle with hx of NMSC. 3. Hx of NMSC - BCC on the R nasal tip, s/p Mohs in 2016 by Dr. Cyndi Carvalho. She is unhappy with scar appearance but otw no concerns. Hx of papule on the R medial cheek. It is asx. Noted at 2014 visit and is no bigger. C/w [address]. 4. C/o brown lesions on the dorsum of the hands and cheeks - may be interested in removal for the face. 5. She has intermittent flares of cold sores on the lips. No recent flares. 6. F/u for furuncles in the axilla  - treated with Bactrim and doxy in the past (prior to seeing me). Nasal culture + staph. No flares since treating with bactroban for 5 days monthly. 7. No new AK's or probs with previous sites. 8. She has a purplish lesion on the R nasal bridge - present for many yearas. Asx. She sees Flori Avila at [facility] - previously using:  - IS Clinical Lightening Complex and Serum (PM)  - IS Extra Protect SPF 30  - [employer] Refinishing and Transforming Complex    Review of Systems:  Gen: Feels well, good sense of health. Skin: No changing moles or lesions. Past Medical History, Family History, Surgical History, Medications and Allergies reviewed.     Past Medical History:   Diagnosis Date    Basal cell carcinoma     Depression     Obesity     Post-menopausal        Past Surgical History:   Procedure Laterality Date    KNEE ARTHROSCOPY  5/2004    left knee    KNEE SURGERY  5/2005    lt TKR    MOHS SURGERY         Outpatient Medications Marked as Taking for the 1/31/22 encounter (Office Visit) with Bethel Yoon MD   Medication Sig Dispense Refill    budesonide-formoterol (SYMBICORT) 160-4.5 MCG/ACT AERO Inhale 2 puffs into the lungs 2 times daily      azelastine (ASTELIN) 0.1 % nasal spray       sodium chloride, Inhalant, 3 % nebulizer solution Inhale 3 mLs into the lungs 2 times daily      clotrimazole (MYCELEX) 10 MG tammy Take 10 mg by mouth 3 times daily      naproxen (NAPROSYN) 500 MG tablet TAKE 1 TABLET BY MOUTH TWICE A DAY WITH MEALS 180 tablet 3    albuterol sulfate HFA (PROVENTIL HFA) 108 (90 Base) MCG/ACT inhaler Inhale 2 puffs into the lungs every 6 hours as needed for Wheezing 1 each 3    nystatin (MYCOSTATIN) 095758 UNIT/GM powder Apply topically 2 times daily Apply 3 times daily. 45 g 2    diclofenac sodium (VOLTAREN) 1 % GEL Apply 2 g topically 4 times daily as needed for Pain 5  100 gram tubes 500 g 5    zolpidem (AMBIEN) 10 MG tablet TAKE 1 TABLET BY MOUTH NIGHTLY AS NEEDED FOR SLEEP FOR UP  DAYS.**3/3/21** 90 tablet 1    mupirocin (BACTROBAN) 2 % ointment Apply to affected area BID 22 g 1    amitriptyline (ELAVIL) 25 MG tablet TAKE 1 TABLET NIGHTLY 90 tablet 4    buPROPion (WELLBUTRIN XL) 150 MG extended release tablet Take 1 tablet by mouth 2 times daily TAKE 1 TABLET EVERY MORNING 180 tablet 3    omeprazole (PRILOSEC) 20 MG delayed release capsule Take 1 capsule by mouth daily 90 capsule 3    valACYclovir (VALTREX) 1 g tablet 2 tabs po at first signs of flare and then 2 tabs po 12 hours later. 16 tablet 2    ESTRACE VAGINAL 0.1 MG/GM vaginal cream       vitamin B-12 (CYANOCOBALAMIN) 100 MCG tablet Take 50 mcg by mouth daily.       Vitamin D (CHOLECALCIFEROL) 1000 UNITS CAPS capsule Take 1,250 Units by mouth daily.  Biotin 5000 MCG CAPS Take  by mouth.  calcium carbonate 600 MG TABS tablet Take 1 tablet by mouth daily. Allergies   Allergen Reactions    Cephalosporins     Anastrozole Other (See Comments)     Joint pain and trouble walking. States, \"gives me awful joint pain/stiffness\"      Cymbalta [Duloxetine Hcl]      Effects mood, vision and mind    Doxycycline Hyclate     Hydrocodone-Acetaminophen      \"makes me sweaty and cry a lot\"    Keflex [Cephalexin] Nausea Only    Lyrica [Pregabalin]      Double vision, feet and hand swelling    Sulfamethoxazole-Trimethoprim     Vioxx     Codeine Nausea And Vomiting         Physical Examination     Gen, NAD    FSE done today except for underwear-covered areas    1. R middle finger with scaly erythematous patch/thin plaque  2. trunk and extremities with scattered brown and red macules and papules   Tan macules on the face  Legs with grayish-tan stuck-on dull 2-3 mm papules  3. Depressed round scar on the R nasal tip - clear  4. Dorsum of the hands with tan macules; cheeks with tan macules  5. Lips clear  6. PIH/scar in the R axilla  7. No AK's  8. R nasal bridge with purplish papule            Assessment and Plan     1. Hx of chronic intermittent dermatitis - R middle finger - flared mildly today  - start clobetasol oint bid; ed se/misuse    2. Benign-appearing nevi, SK's/stucco keratosis and angiomas  3. Hx of NMSC, no signs recurrence  - educ re ABCD's of MM   educ sun protection SPF 30+  encouraged skin check yearly (sooner if indicated), self checks    4. Lentigines on the hands and cheeks  - ed laser, 300/trx - IPL/GL for the face, 2-3 trx; ed mult trx, dyspig, erythema, downtime, new lesions  - ed peels for the face - VI - 250    5.  HSV flares - lips - clear today - valtrex prn flares    6. axilla boils - c/w staph furunculosis - no recent flares  6b. nasal swab + staph in 2021  - cont bactroban prn flares and to nose x few days monthly    7. AK - clear today    8. 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

## 2022-03-14 LAB — MAMMOGRAPHY, EXTERNAL: NORMAL

## 2022-07-01 ENCOUNTER — PROCEDURE VISIT (OUTPATIENT)
Dept: SURGERY | Age: 72
End: 2022-07-01
Payer: MEDICARE

## 2022-07-01 VITALS
SYSTOLIC BLOOD PRESSURE: 149 MMHG | BODY MASS INDEX: 41.66 KG/M2 | WEIGHT: 244 LBS | TEMPERATURE: 98 F | DIASTOLIC BLOOD PRESSURE: 80 MMHG | HEIGHT: 64 IN | HEART RATE: 55 BPM

## 2022-07-01 DIAGNOSIS — L02.411 ABSCESS OF AXILLA, RIGHT: Primary | ICD-10-CM

## 2022-07-01 PROBLEM — S93.602A SPRAIN OF LEFT FOOT: Status: ACTIVE | Noted: 2017-11-08

## 2022-07-01 PROBLEM — J30.89 NON-SEASONAL ALLERGIC RHINITIS: Status: ACTIVE | Noted: 2022-06-16

## 2022-07-01 PROBLEM — G47.33 OBSTRUCTIVE SLEEP APNEA SYNDROME: Status: ACTIVE | Noted: 2022-01-09

## 2022-07-01 PROBLEM — N32.81 OAB (OVERACTIVE BLADDER): Status: ACTIVE | Noted: 2022-06-16

## 2022-07-01 PROBLEM — C50.912 MALIGNANT NEOPLASM OF UNSPECIFIED SITE OF LEFT FEMALE BREAST (HCC): Status: ACTIVE | Noted: 2020-03-05

## 2022-07-01 PROBLEM — K57.30 SIGMOID DIVERTICULOSIS: Status: ACTIVE | Noted: 2022-01-09

## 2022-07-01 PROBLEM — M15.9 PRIMARY OSTEOARTHRITIS INVOLVING MULTIPLE JOINTS: Status: ACTIVE | Noted: 2022-01-09

## 2022-07-01 PROBLEM — F51.01 PRIMARY INSOMNIA: Status: ACTIVE | Noted: 2022-01-09

## 2022-07-01 PROBLEM — E55.9 VITAMIN D DEFICIENCY, UNSPECIFIED: Status: ACTIVE | Noted: 2020-11-30

## 2022-07-01 PROBLEM — J47.9 BRONCHIECTASIS WITHOUT COMPLICATION (HCC): Status: ACTIVE | Noted: 2022-01-21

## 2022-07-01 PROBLEM — M15.0 PRIMARY OSTEOARTHRITIS INVOLVING MULTIPLE JOINTS: Status: ACTIVE | Noted: 2022-01-09

## 2022-07-01 PROBLEM — M65.342 TRIGGER RING FINGER OF LEFT HAND: Status: ACTIVE | Noted: 2022-04-04

## 2022-07-01 PROBLEM — J45.40 MODERATE PERSISTENT ASTHMA WITHOUT COMPLICATION: Status: ACTIVE | Noted: 2022-01-09

## 2022-07-01 PROBLEM — R53.1 WEAKNESS: Status: ACTIVE | Noted: 2020-09-28

## 2022-07-01 PROBLEM — Z90.3 H/O GASTRIC SLEEVE: Status: ACTIVE | Noted: 2022-06-16

## 2022-07-01 PROBLEM — M76.822 POSTERIOR TIBIAL TENDINITIS OF LEFT LEG: Status: ACTIVE | Noted: 2017-11-08

## 2022-07-01 PROBLEM — F32.A MILD DEPRESSION: Status: ACTIVE | Noted: 2022-01-09

## 2022-07-01 PROBLEM — M19.079 ARTHRITIS, MIDFOOT: Status: ACTIVE | Noted: 2017-11-08

## 2022-07-01 PROBLEM — M50.30 DDD (DEGENERATIVE DISC DISEASE), CERVICAL: Status: ACTIVE | Noted: 2022-01-09

## 2022-07-01 PROCEDURE — 10061 I&D ABSCESS COMP/MULTIPLE: CPT | Performed by: SURGERY

## 2022-07-01 PROCEDURE — 99202 OFFICE O/P NEW SF 15 MIN: CPT | Performed by: SURGERY

## 2022-07-01 RX ORDER — ZOLPIDEM TARTRATE 10 MG/1
TABLET ORAL
COMMUNITY
Start: 2022-04-04

## 2022-07-01 RX ORDER — FLUTICASONE FUROATE, UMECLIDINIUM BROMIDE AND VILANTEROL TRIFENATATE 200; 62.5; 25 UG/1; UG/1; UG/1
POWDER RESPIRATORY (INHALATION)
COMMUNITY
Start: 2022-04-14

## 2022-07-01 RX ORDER — LEVOFLOXACIN 500 MG/1
500 TABLET, FILM COATED ORAL DAILY
COMMUNITY
Start: 2022-07-01

## 2022-07-01 RX ORDER — LANOLIN ALCOHOL/MO/W.PET/CERES
CREAM (GRAM) TOPICAL
COMMUNITY

## 2022-07-01 NOTE — PROGRESS NOTES
[facility]  [facility]    HPI: Ms. Leyla Camejo is here for management of right axillary mass. The mass is located in right axilla and present for last two weeks. The mass is increasing in size. It is associated with pain and redness. No other symptoms. No history of similar mass in the past.  She does not have any other masses. She is started on antibiotics. Past Medical History:   Diagnosis Date    Basal cell carcinoma     Depression     Obesity     Post-menopausal      Past Surgical History:   Procedure Laterality Date    KNEE ARTHROSCOPY  5/2004    left knee    KNEE SURGERY  5/2005    lt TKR    MOHS SURGERY       Social:   Social History     Tobacco Use    Smoking status: Never Smoker    Smokeless tobacco: Never Used   Vaping Use    Vaping Use: Never used   Substance Use Topics    Alcohol use: Not on file    Drug use: Not on file     Family History   Problem Relation Age of Onset    Heart Attack Father     Diabetes Other     Cancer Maternal Uncle         bcc-mohs    Cancer Maternal Grandfather         skin cancer ?      Allergies: Cephalosporins, Cymbalta [duloxetine hcl], Doxycycline hyclate, Hydrocodone, Hydrocodone-acetaminophen, Keflex [cephalexin], Lyrica [pregabalin], Rofecoxib, Sulfamethoxazole-trimethoprim, Vioxx, Anastrozole, and Codeine  Current Outpatient Medications   Medication Sig Dispense Refill    levoFLOXacin (LEVAQUIN) 500 MG tablet Take 500 mg by mouth daily      VITAMIN D, CHOLECALCIFEROL, PO Take by mouth      vitamin B-12 (CYANOCOBALAMIN) 1000 MCG tablet Take by mouth      zolpidem (AMBIEN) 10 MG tablet       TRELEGY ELLIPTA 200-62.5-25 MCG/INH AEPB       Prenatal Vit-Fe Fumarate-FA (PRENATAL VITAMIN PLUS LOW IRON PO) Take 1 tablet by mouth daily      budesonide-formoterol (SYMBICORT) 160-4.5 MCG/ACT AERO Inhale 2 puffs into the lungs 2 times daily      azelastine (ASTELIN) 0.1 % nasal spray       sodium chloride, Inhalant, 3 % nebulizer solution Inhale 3 mLs into the lungs 2 times daily      clotrimazole (MYCELEX) 10 MG tammy Take 10 mg by mouth 3 times daily      naproxen (NAPROSYN) 500 MG tablet TAKE 1 TABLET BY MOUTH TWICE A DAY WITH MEALS 180 tablet 3    albuterol sulfate HFA (PROVENTIL HFA) 108 (90 Base) MCG/ACT inhaler Inhale 2 puffs into the lungs every 6 hours as needed for Wheezing 1 each 3    nystatin (MYCOSTATIN) 256430 UNIT/GM powder Apply topically 2 times daily Apply 3 times daily. 45 g 2    diclofenac sodium (VOLTAREN) 1 % GEL Apply 2 g topically 4 times daily as needed for Pain 5  100 gram tubes 500 g 5    mupirocin (BACTROBAN) 2 % ointment Apply to affected area BID 22 g 1    amitriptyline (ELAVIL) 25 MG tablet TAKE 1 TABLET NIGHTLY 90 tablet 4    buPROPion (WELLBUTRIN XL) 150 MG extended release tablet Take 1 tablet by mouth 2 times daily TAKE 1 TABLET EVERY MORNING 180 tablet 3    omeprazole (PRILOSEC) 20 MG delayed release capsule Take 1 capsule by mouth daily 90 capsule 3    diclofenac sodium 1 % GEL APPLY 2 GRAMS TOPICALLY FOUR TIMES A DAY  AS DIRECTED (Patient not taking: Reported on 1/31/2022) 5 Tube 3    valACYclovir (VALTREX) 1 g tablet 2 tabs po at first signs of flare and then 2 tabs po 12 hours later. 16 tablet 2    ESTRACE VAGINAL 0.1 MG/GM vaginal cream       vitamin B-12 (CYANOCOBALAMIN) 100 MCG tablet Take 50 mcg by mouth daily.  Vitamin D (CHOLECALCIFEROL) 1000 UNITS CAPS capsule Take 1,250 Units by mouth daily.  Biotin 5000 MCG CAPS Take  by mouth.  calcium carbonate 600 MG TABS tablet Take 1 tablet by mouth daily. No current facility-administered medications for this visit. Review of Systems: See HPI  All other systems reviewed and are negative.     Vitals:    07/01/22 1244   BP: (!) 149/80   Pulse: 55   Temp: 98 °F (36.7 °C)   Weight: 244 lb (110.7 kg)   Height: 5' 4\" (1.626 m)     Wt Readings from Last 3 Encounters:   07/01/22 244 lb (110.7 kg)   10/01/21 249 lb (112.9

## 2022-07-02 NOTE — PROGRESS NOTES
All the complications including healing issues were explained. Risks, benefits and alternatives of surgery explained to the patient and patient wishes to proceed with surgery. The lesion was ID with patient, prepped and draped. Local anesthesia was infiltrated and incised and drained. Loculations are broken with clamp and irrigated. The wound was packed appropriately with gauze. Dressing was applied. The patient tolerated the procedure well. Culture was not sent as patient is already on antibiotics.

## 2022-07-28 ENCOUNTER — OFFICE VISIT (OUTPATIENT)
Dept: DERMATOLOGY | Age: 72
End: 2022-07-28
Payer: MEDICARE

## 2022-07-28 DIAGNOSIS — L82.1 SEBORRHEIC KERATOSIS: ICD-10-CM

## 2022-07-28 DIAGNOSIS — L57.0 AK (ACTINIC KERATOSIS): ICD-10-CM

## 2022-07-28 DIAGNOSIS — D22.9 MULTIPLE NEVI: ICD-10-CM

## 2022-07-28 DIAGNOSIS — B00.9 HSV INFECTION: ICD-10-CM

## 2022-07-28 DIAGNOSIS — Z85.828 HISTORY OF NONMELANOMA SKIN CANCER: ICD-10-CM

## 2022-07-28 DIAGNOSIS — D18.01 HEMANGIOMA OF SKIN: ICD-10-CM

## 2022-07-28 DIAGNOSIS — L02.92 FURUNCLE: ICD-10-CM

## 2022-07-28 DIAGNOSIS — L81.4 LENTIGINES: ICD-10-CM

## 2022-07-28 DIAGNOSIS — L30.9 DERMATITIS: Primary | ICD-10-CM

## 2022-07-28 PROCEDURE — 17000 DESTRUCT PREMALG LESION: CPT | Performed by: DERMATOLOGY

## 2022-07-28 PROCEDURE — 99214 OFFICE O/P EST MOD 30 MIN: CPT | Performed by: DERMATOLOGY

## 2022-07-28 PROCEDURE — 1123F ACP DISCUSS/DSCN MKR DOCD: CPT | Performed by: DERMATOLOGY

## 2022-07-28 NOTE — PROGRESS NOTES
[facility] Dermatology  Rubi Baez MD  [address]  1950    70 y.o. female     Date of Visit: 7/28/2022    Chief Complaint: moles/lesions, f/u skin cancer  Chief Complaint   Patient presents with    Skin Exam     Last seen: 1-2022  *diagnosed with breast cancer in early 2020 - surg and radiation  *shoulder surgery recently - 2022  *hx laser for lentigines    History of Present Illness:    1. Hx of dermatitis (fingers) - no recent flares. 2. Here for full skin check for scattered brown and red lesions on the trunk and extremities. Most have been present for many years; no change in size/shape/color of any lesions; no bleeding lesions. No personal hx of skin cancer; grandparent and maternal uncle with hx of NMSC. 3. Hx of NMSC - BCC on the R nasal tip, s/p Mohs in 2016 by Dr. Emely Valencia. She is unhappy with scar appearance but otw no concerns. Hx of papule on the R medial cheek. It is asx. Noted at 2014 visit and is no bigger. C/w [address]. 4. C/o brown lesions on the dorsum of the hands and cheeks - may be interested in removal for the face. 5. She has intermittent flares of cold sores on the lips. No recent flares. 6. F/u for furuncles in the axilla  - treated with Bactrim and doxy in the past (prior to seeing me). Nasal culture + staph. No flares since treating with bactroban for 5 days monthly. 7. F/u AK's - no probs with previous sites. 1 new rough spot on the chest.  Asx.     8. She has a purplish lesion on the R nasal bridge - present for many yearas. Asx. She sees Esteban Marie at [facility] - previously using:  - IS Clinical Lightening Complex and Serum (PM)  - IS Extra Protect SPF 30  - [employer] Refinishing and Transforming Complex    Review of Systems:  Gen: Feels well, good sense of health. Skin: No changing moles or lesions. Past Medical History, Family History, Surgical History, Medications and Allergies reviewed.     Past Medical History: Diagnosis Date    Basal cell carcinoma     Depression     Obesity     Post-menopausal        Past Surgical History:   Procedure Laterality Date    KNEE ARTHROSCOPY  5/2004    left knee    KNEE SURGERY  5/2005    lt TKR    MOHS SURGERY         Outpatient Medications Marked as Taking for the 7/28/22 encounter (Office Visit) with Loco Woods MD   Medication Sig Dispense Refill    VITAMIN D, CHOLECALCIFEROL, PO Take by mouth      vitamin B-12 (CYANOCOBALAMIN) 1000 MCG tablet Take by mouth      zolpidem (AMBIEN) 10 MG tablet       levoFLOXacin (LEVAQUIN) 500 MG tablet Take 500 mg by mouth daily      TRELEGY ELLIPTA 200-62.5-25 MCG/INH AEPB       Prenatal Vit-Fe Fumarate-FA (PRENATAL VITAMIN PLUS LOW IRON PO) Take 1 tablet by mouth daily      budesonide-formoterol (SYMBICORT) 160-4.5 MCG/ACT AERO Inhale 2 puffs into the lungs 2 times daily      azelastine (ASTELIN) 0.1 % nasal spray       sodium chloride, Inhalant, 3 % nebulizer solution Inhale 3 mLs into the lungs 2 times daily      clotrimazole (MYCELEX) 10 MG tammy Take 10 mg by mouth 3 times daily      naproxen (NAPROSYN) 500 MG tablet TAKE 1 TABLET BY MOUTH TWICE A DAY WITH MEALS 180 tablet 3    nystatin (MYCOSTATIN) 921851 UNIT/GM powder Apply topically 2 times daily Apply 3 times daily.  45 g 2    diclofenac sodium (VOLTAREN) 1 % GEL Apply 2 g topically 4 times daily as needed for Pain 5  100 gram tubes 500 g 5    mupirocin (BACTROBAN) 2 % ointment Apply to affected area BID 22 g 1    amitriptyline (ELAVIL) 25 MG tablet TAKE 1 TABLET NIGHTLY 90 tablet 4    buPROPion (WELLBUTRIN XL) 150 MG extended release tablet Take 1 tablet by mouth 2 times daily TAKE 1 TABLET EVERY MORNING 180 tablet 3    omeprazole (PRILOSEC) 20 MG delayed release capsule Take 1 capsule by mouth daily 90 capsule 3    diclofenac sodium 1 % GEL APPLY 2 GRAMS TOPICALLY FOUR TIMES A DAY  AS DIRECTED (Patient taking differently: APPLY 2 GRAMS TOPICALLY FOUR TIMES A DAY  AS DIRECTED) 5 Tube 3    valACYclovir (VALTREX) 1 g tablet 2 tabs po at first signs of flare and then 2 tabs po 12 hours later. 16 tablet 2    ESTRACE VAGINAL 0.1 MG/GM vaginal cream       vitamin B-12 (CYANOCOBALAMIN) 100 MCG tablet Take 50 mcg by mouth daily. Vitamin D (CHOLECALCIFEROL) 1000 UNITS CAPS capsule Take 1,250 Units by mouth daily. Biotin 5000 MCG CAPS Take  by mouth.      calcium carbonate 600 MG TABS tablet Take 1 tablet by mouth daily. Allergies   Allergen Reactions    Cephalosporins     Cymbalta [Duloxetine Hcl]      Effects mood, vision and mind    Doxycycline Hyclate     Hydrocodone Nausea Only    Hydrocodone-Acetaminophen      \"makes me sweaty and cry a lot\"    Keflex [Cephalexin] Nausea Only    Lyrica [Pregabalin]      Double vision, feet and hand swelling    Rofecoxib      Same rx as pregabalin  swelling of hands/feet, vision impaired    Sulfamethoxazole-Trimethoprim     Vioxx     Anastrozole Other (See Comments)     Joint pain and trouble walking. States, \"gives me awful joint pain/stiffness\"    Joint pain and trouble walking. Other reaction(s): Other (See Comments)    Codeine Nausea And Vomiting         Physical Examination     Gen, NAD    FSE done today except for underwear-covered areas    1. Fingers clear  2. trunk and extremities with scattered brown and red macules and papules   Tan macules on the face  Legs with grayish-tan stuck-on dull 2-3 mm papules  3. Depressed round scar on the R nasal tip - clear  4. Dorsum of the hands with tan macules; cheeks with tan macules  5. Lips clear  6. PIH/scar in the R axilla  7. Chest with erythematous roughly scaled macule   8. R nasal bridge with purplish papule            Assessment and Plan     1. Hx of chronic intermittent dermatitis - R middle finger - stable/clear today  - clobetasol oint bid; ed se/misuse    2. Benign-appearing nevi, SK's/stucco keratosis and angiomas  3.  Hx of NMSC, no signs recurrence  - Monitor for ABCD's of MM and si/sx of NMSC  Continue sun protection - OTC sunscreen with SPF 30-50+ recommended and reviewed usage  Encouraged skin check yearly (sooner if indicated), self checks    4. Lentigines on the hands and cheeks  - ed laser, 300/trx - IPL+GL for the face, 2-3 trx; ed mult trx, dyspig, erythema, downtime, new lesions  - would like maximum effect with 1 trx but ed may need additional trx for lighter lesions  - ed peels for the face - VI - 250    5. HSV flares - lips - clear today - valtrex prn flares    6. axilla boils - c/w staph furunculosis - no recent flares  6b. nasal swab + staph in 2021  - cont bactroban prn flares and to nose x few days monthly    7. AK and chronic photodamage - 1 new today - chest  - - 1 lesion(s) treated with liquid nitrogen with cryac or swab. Treated with 2 cycles for 1-5 seconds each after consent from patient. Patient educated on risk of blister, hypopigmentation/scar and wound care. Tolerated well. 8. 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

## 2022-07-29 ENCOUNTER — TELEPHONE (OUTPATIENT)
Dept: DERMATOLOGY | Age: 72
End: 2022-07-29

## 2022-07-29 NOTE — TELEPHONE ENCOUNTER
Pt calling wanting to change her laser day appt to January pls return call back @ [phone] to discuss

## 2022-10-17 NOTE — TELEPHONE ENCOUNTER
- Office will call to schedule testing    - Office will call or send message with testing results     Lm--need to know which provider she will see in this office. Will associate that script with new provider.

## 2022-10-27 ENCOUNTER — TELEPHONE (OUTPATIENT)
Dept: DERMATOLOGY | Age: 72
End: 2022-10-27

## 2022-10-27 NOTE — TELEPHONE ENCOUNTER
Pt calling wanting to know which cream she should use on her blisters around the corners of her mouth she states she have 2 different creams not sure which one to use clobetasol mupirocin pls return call back to discuss

## 2022-10-27 NOTE — TELEPHONE ENCOUNTER
It's hard to say with certainty without seeing her in person, but these look concerning for cold sores (HSV) in the picture so I'm glad she took the valtrex. I'd do aquaphor multiple times daily as a barrier and to help heal.  She can also use a dab of clobetasol twice daily for the next 3-4 days, but it's too strong to use long-term. If she isn't doing better over the next few days, then I'd like to see her in person to look at this and see if we need to culture anything. She declined appt offered today.

## 2022-10-27 NOTE — TELEPHONE ENCOUNTER
Touched base with patient-    3year old grand daughter-chapped lips    Center of bottom lip-nasty little blisters (since last Saturday), took valtrex yesterday and the day before, but today top and bottom lip are horrible. Patient not capable to send photo through [facility] [address], so she is sending to my email for review. Offered patient two appointments and she declined both.

## 2022-12-20 ENCOUNTER — TELEPHONE (OUTPATIENT)
Dept: DERMATOLOGY | Age: 72
End: 2022-12-20

## 2022-12-20 NOTE — TELEPHONE ENCOUNTER
[phone]. Patient has laser appointment on 1/5/23 and just has some questions she would like to ask Daune Pulling beforehand.     Please advise, thank you!!

## 2022-12-21 NOTE — TELEPHONE ENCOUNTER
Patient rescheduled January laser due to having an event on January 7th. Patient rescheduled for March laser day.

## 2023-02-17 ENCOUNTER — TELEPHONE (OUTPATIENT)
Dept: DERMATOLOGY | Age: 73
End: 2023-02-17

## 2023-02-17 NOTE — TELEPHONE ENCOUNTER
Pt just has a quick question about her upcoming laser appt. She may need to cancel but she has a few questions first. She was told this may have to wait until Monday.   Phone number is [phone]

## 2023-02-20 NOTE — TELEPHONE ENCOUNTER
Called pt - wanting to know if she should cancel cosmetic appoint  Due to upcoming [facility] vacation  Wouldn't she just get more spots maybe?   MD stated maybe she should wait til after  Vacation/summer  Pt agreed  Wants appoint in Jan 2024, but we do not sched more than six months    Pt verbalized an understanding

## 2023-06-20 ENCOUNTER — OFFICE VISIT (OUTPATIENT)
Dept: DERMATOLOGY | Age: 73
End: 2023-06-20
Payer: MEDICARE

## 2023-06-20 DIAGNOSIS — L81.4 LENTIGINES: ICD-10-CM

## 2023-06-20 DIAGNOSIS — L57.0 AK (ACTINIC KERATOSIS): ICD-10-CM

## 2023-06-20 DIAGNOSIS — L82.1 SEBORRHEIC KERATOSIS: ICD-10-CM

## 2023-06-20 DIAGNOSIS — D22.9 MULTIPLE NEVI: ICD-10-CM

## 2023-06-20 DIAGNOSIS — D48.5 NEOPLASM OF UNCERTAIN BEHAVIOR OF SKIN: Primary | ICD-10-CM

## 2023-06-20 DIAGNOSIS — B00.9 HSV INFECTION: ICD-10-CM

## 2023-06-20 DIAGNOSIS — Z85.828 HISTORY OF NONMELANOMA SKIN CANCER: ICD-10-CM

## 2023-06-20 DIAGNOSIS — L02.92 FURUNCLE: ICD-10-CM

## 2023-06-20 DIAGNOSIS — D18.01 HEMANGIOMA OF SKIN: ICD-10-CM

## 2023-06-20 PROCEDURE — 1090F PRES/ABSN URINE INCON ASSESS: CPT | Performed by: DERMATOLOGY

## 2023-06-20 PROCEDURE — G8427 DOCREV CUR MEDS BY ELIG CLIN: HCPCS | Performed by: DERMATOLOGY

## 2023-06-20 PROCEDURE — 1036F TOBACCO NON-USER: CPT | Performed by: DERMATOLOGY

## 2023-06-20 PROCEDURE — 1123F ACP DISCUSS/DSCN MKR DOCD: CPT | Performed by: DERMATOLOGY

## 2023-06-20 PROCEDURE — 3017F COLORECTAL CA SCREEN DOC REV: CPT | Performed by: DERMATOLOGY

## 2023-06-20 PROCEDURE — G8417 CALC BMI ABV UP PARAM F/U: HCPCS | Performed by: DERMATOLOGY

## 2023-06-20 PROCEDURE — 11102 TANGNTL BX SKIN SINGLE LES: CPT | Performed by: DERMATOLOGY

## 2023-06-20 PROCEDURE — 99214 OFFICE O/P EST MOD 30 MIN: CPT | Performed by: DERMATOLOGY

## 2023-06-20 PROCEDURE — G8400 PT W/DXA NO RESULTS DOC: HCPCS | Performed by: DERMATOLOGY

## 2023-06-20 NOTE — PROGRESS NOTES
[facility] Dermat[facility]  Shaun Duke MD  [phone]      [address]  1950    67 y.o. female     Date of Visit: 6/20/2023    Chief Complaint: moles/lesions, f/u skin cancer  Chief Complaint   Patient presents with    Skin Lesion     Last seen: 7-2022  *diagnosed with breast cancer in early 2020 - surg and radiation  *shoulder surgery recently - 2022  *hx laser for lentigines    History of Present Illness:    1. The lesion on the upper back that has been bothersome and concerning recently. Feels a raised crusty papule here. 2. Here for full skin check for scattered brown and red lesions on the trunk and extremities. Most have been present for many years; no change in size/shape/color of any lesions; no bleeding lesions. No personal hx of skin cancer; grandparent and maternal uncle with hx of NMSC. 3. Hx of NMSC - BCC on the R nasal tip, s/p Mohs in 2016 by Dr. Julissa Perez. She is unhappy with scar appearance but otw no concerns. Hx of papule on the R medial cheek. It is asx. Noted at 2014 visit and is no bigger. C/w [address]. 4. Previously discussed brown lesions on the dorsum of the hands and cheeks - may be interested in removal for the face. 5. She has intermittent flares of cold sores on the lips. No recent flares. 6. F/u for furuncles in the axilla  - treated with Bactrim and doxy in the past (prior to seeing me). Nasal culture + staph. No flares since treating with bactroban for 5 days each month. 7. F/u AK's - no probs with previous sites. No new concerns. 8. She has a purplish lesion on the R nasal bridge - present for many yearas. Asx.     9/ Hx of dermatitis (fingers) - no recent flares. She sees Nicol Collins at [facility] - previously using:  - IS Clinical Lightening Complex and Serum (PM)  - IS Extra Protect SPF 30  - [employer] Refinishing and Transforming Complex    Review of Systems:  Gen: Feels well, good sense of health.   Skin: No changing moles or

## 2023-06-20 NOTE — PATIENT INSTRUCTIONS

## 2023-07-05 LAB — DERMATOLOGY PATHOLOGY REPORT: NORMAL

## 2024-07-02 ENCOUNTER — OFFICE VISIT (OUTPATIENT)
Dept: DERMATOLOGY | Age: 74
End: 2024-07-02
Payer: MEDICARE

## 2024-07-02 DIAGNOSIS — L82.1 SEBORRHEIC KERATOSIS: ICD-10-CM
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
## 2024-07-02 DIAGNOSIS — D22.9 MULTIPLE NEVI: Primary | ICD-10-CM

## 2024-07-02 DIAGNOSIS — B00.9 HSV INFECTION: ICD-10-CM

## 2024-07-02 DIAGNOSIS — L30.9 DERMATITIS: ICD-10-CM

## 2024-07-02 DIAGNOSIS — Z85.828 HISTORY OF NONMELANOMA SKIN CANCER: ICD-10-CM

## 2024-07-02 DIAGNOSIS — L81.4 LENTIGINES: ICD-10-CM

## 2024-07-02 DIAGNOSIS — L57.0 AK (ACTINIC KERATOSIS): ICD-10-CM

## 2024-07-02 DIAGNOSIS — D18.01 HEMANGIOMA OF SKIN: ICD-10-CM

## 2024-07-02 PROCEDURE — G8427 DOCREV CUR MEDS BY ELIG CLIN: HCPCS | Performed by: DERMATOLOGY

## 2024-07-02 PROCEDURE — 3017F COLORECTAL CA SCREEN DOC REV: CPT | Performed by: DERMATOLOGY

## 2024-07-02 PROCEDURE — 99214 OFFICE O/P EST MOD 30 MIN: CPT | Performed by: DERMATOLOGY

## 2024-07-02 PROCEDURE — G8400 PT W/DXA NO RESULTS DOC: HCPCS | Performed by: DERMATOLOGY

## 2024-07-02 PROCEDURE — 1036F TOBACCO NON-USER: CPT | Performed by: DERMATOLOGY

## 2024-07-02 PROCEDURE — 1123F ACP DISCUSS/DSCN MKR DOCD: CPT | Performed by: DERMATOLOGY

## 2024-07-02 PROCEDURE — G8421 BMI NOT CALCULATED: HCPCS | Performed by: DERMATOLOGY

## 2024-07-02 PROCEDURE — 1090F PRES/ABSN URINE INCON ASSESS: CPT | Performed by: DERMATOLOGY

## 2024-07-02 RX ORDER — CLOBETASOL PROPIONATE 0.5 MG/G
OINTMENT TOPICAL
Qty: 30 G | Refills: 0 | Status: SHIPPED | OUTPATIENT
Start: 2024-07-02

## 2024-07-02 NOTE — PROGRESS NOTES
[facility] Dermatology  Waleska Rodriguez MD  [phone]      [address]  1950    73 y.o. female     Date of Visit: 7/2/2024    Chief Complaint: moles/lesions, f/u skin cancer  Chief Complaint   Patient presents with    Skin Exam     GLP-1 compounded drug-not in med list? Something c/w   mounjaro     Last seen: 6-2023  *diagnosed with breast cancer in early 2020 - surg and radiation  *shoulder surgery recently - 2022  *hx laser for lentigines    History of Present Illness:    1.  Here for full skin check for scattered brown and red lesions on the trunk and extremities. Most have been present for many years; no change in size/shape/color of any lesions; no bleeding lesions.  No personal hx of skin cancer; grandparent and maternal uncle with hx of NMSC.    2. Hx of NMSC - BCC on the R nasal tip, s/p Mohs in 2016 by Dr. Dugan.  She is unhappy with scar appearance but otw no concerns.    Hx of papule on the R medial cheek.  It is asx.  Noted at 2014 visit and is no bigger.  C/w [facility].    3. Previously discussed brown lesions on the dorsum of the hands and cheeks - may be interested in removal for the face.    4. She has intermittent flares of cold sores on the lips.  No recent flares.    5. F/u for furuncles in the axilla  - treated with Bactrim and doxy in the past (prior to seeing me).  Nasal culture + staph.  No flares since treating with bactroban for 5 days each month.    6. F/u AK's - no probs with previous sites.  No new concerns.    7. She has a purplish lesion on the R nasal bridge - present for many yearas.  Asx.     8. Hx of dermatitis (fingers) - no recent flares.    9. Requests rx for bug bites on the legs.  Itchy.  Not many currently but would like something to keep at home.    She sees Memo ortega [facility] - previously using:  - IS Clinical Lightening Complex and Serum (PM)  - IS Extra Protect SPF 30  - [employer] Refinishing and Transforming Complex    Review of Systems:  Gen: Feels well,

## 2025-07-08 ENCOUNTER — OFFICE VISIT (OUTPATIENT)
Age: 75
End: 2025-07-08
Payer: MEDICARE

## 2025-07-08 DIAGNOSIS — D22.9 MULTIPLE NEVI: Primary | ICD-10-CM

## 2025-07-08 DIAGNOSIS — L81.4 LENTIGINES: ICD-10-CM

## 2025-07-08 DIAGNOSIS — K13.0 CHEILITIS: ICD-10-CM

## 2025-07-08 DIAGNOSIS — D18.01 HEMANGIOMA OF SKIN: ICD-10-CM

## 2025-07-08 DIAGNOSIS — L30.9 DERMATITIS: ICD-10-CM

## 2025-07-08 DIAGNOSIS — L82.1 SEBORRHEIC KERATOSIS: ICD-10-CM

## 2025-07-08 DIAGNOSIS — L57.0 AK (ACTINIC KERATOSIS): ICD-10-CM

## 2025-07-08 DIAGNOSIS — L82.0 INFLAMED SEBORRHEIC KERATOSIS: ICD-10-CM

## 2025-07-08 DIAGNOSIS — Z85.828 HISTORY OF NONMELANOMA SKIN CANCER: ICD-10-CM

## 2025-07-08 DIAGNOSIS — B00.9 HSV INFECTION: ICD-10-CM

## 2025-07-08 PROCEDURE — 17110 DESTRUCTION B9 LES UP TO 14: CPT | Performed by: DERMATOLOGY

## 2025-07-08 PROCEDURE — 99213 OFFICE O/P EST LOW 20 MIN: CPT | Performed by: DERMATOLOGY

## 2025-07-08 RX ORDER — MUPIROCIN 2 %
OINTMENT (GRAM) TOPICAL
Qty: 22 G | Refills: 1 | Status: SHIPPED | OUTPATIENT
Start: 2025-07-08

## 2025-07-08 RX ORDER — TRIAMCINOLONE ACETONIDE 1 MG/G
OINTMENT TOPICAL
Qty: 15 G | Refills: 1 | Status: SHIPPED | OUTPATIENT
Start: 2025-07-08

## 2025-07-08 NOTE — PROGRESS NOTES
[facility] Dermatology  Waleska Rodriguez MD  [phone]      [address]  1950    74 y.o. female     Date of Visit: 7/8/2025    Chief Complaint: moles/lesions, f/u skin cancer  Chief Complaint   Patient presents with    Skin Exam     Last seen: 7-2024  *diagnosed with breast cancer in early 2020 - surg and radiation  *shoulder surgery 2022; back surgery more recently 2025  *[place] - cancer - thymoma; went to the [place] summer 2025 and responding to trx  *hx laser for lentigines    History of Present Illness:    1.  Here for full skin check for scattered brown and red lesions on the trunk and extremities. Most have been present for many years; no change in size/shape/color of any lesions; no bleeding lesions.  No personal hx of skin cancer; grandparent and maternal uncle with hx of NMSC.    2. Hx of NMSC - BCC on the R nasal tip, s/p Mohs in 2016 by Dr. Dguan.  She has noted that she is unhappy with scar appearance but otw no concerns.    Hx of papule on the R medial cheek.  It is asx.  Noted at 2014 visit and is no bigger.  C/w [facility].    3. Previously discussed brown lesions on the dorsum of the hands and cheeks - may be interested in removal for the face.    4. She has intermittent flares of cold sores on the lips.  No recent flares.    5. F/u for furuncles in the axilla  - treated with Bactrim and doxy in the past (prior to seeing me).  Nasal culture + staph.  No flares since continues to trx with bactroban for 5 days each month.    6. F/u AK's - no probs with previous sites.  No new concerns.    7. She has a purplish lesion on the R nasal bridge - present for many yearas.  Asx.     8. Hx of dermatitis (fingers) - no recent flares.    9. Uses clobetasol prn for bug bites on the legs.  Not bad recently.'    10.  She notes intermittent flares of dry chapped lips x several mos.  Has been using the clobetasol she has at home and notes improvement/clearance within a few days.      11. She notes an

## 2025-07-08 NOTE — PATIENT INSTRUCTIONS
R nasal - consider Vbeam laser or removal with Dr. Gallo - can discuss with her if this is an option with eye surgery      Aquaphor lip repair stick or aquaphor ointment      Cryosurgery (Freezing) Wound Care Instructions    AFTER THE PROCEDURE:   You will notice swelling and redness around the site. This is normal.   You may experience a sharp or sore feeling for the next several days. For this discomfort, you may take acetaminophen (Tylenol©).   A blister may develop at the treated area, sometimes as soon as by the end of the day. After several days, the blister will subside and a scab will form.   If the area is bumped or traumatized during the first few days following freezing, you may develop bleeding into the blister, forming a blood blister. This is nothing to be alarmed about.  If the blister is tense, uncomfortable, or much larger than the site that was frozen, you may pop the blister along its edge with a sterile needle (boiled, heated under a flame, or cleaned with alcohol) to allow the fluid to drain out. If the blister does not bother you, no treatment is needed.   Do NOT peel off the top of the blister roof. It will act as a dressing on top of your wound.   WOUND CARE:   You may shower or bathe as usual, but avoid scrubbing the areas that have been frozen.    Cleanse the site twice a day with mild soapy water, and then apply a thin film of white petrolatum (Vaseline©).   You do not need to cover the area, but can if you prefer.   Do NOT allow the site to become dry or crusted, or attempt to dry it out with rubbing alcohol or hydrogen peroxide.   Continue this regimen until the area is pink and healed. Depending on the size and location of your cryosurgery site, healing may take 2 to 4 weeks.   The area may continue to be pink for several weeks, and over the next few months may become darker or lighter than the surrounding skin. This may be a permanent change.